# Patient Record
Sex: MALE | Race: BLACK OR AFRICAN AMERICAN | Employment: FULL TIME | URBAN - METROPOLITAN AREA
[De-identification: names, ages, dates, MRNs, and addresses within clinical notes are randomized per-mention and may not be internally consistent; named-entity substitution may affect disease eponyms.]

---

## 2017-05-25 ENCOUNTER — OFFICE VISIT (OUTPATIENT)
Dept: FAMILY MEDICINE CLINIC | Age: 27
End: 2017-05-25
Payer: COMMERCIAL

## 2017-05-25 VITALS
SYSTOLIC BLOOD PRESSURE: 120 MMHG | HEART RATE: 83 BPM | BODY MASS INDEX: 30.09 KG/M2 | DIASTOLIC BLOOD PRESSURE: 64 MMHG | WEIGHT: 227 LBS | HEIGHT: 73 IN

## 2017-05-25 DIAGNOSIS — M25.562 CHRONIC PAIN OF BOTH KNEES: Primary | ICD-10-CM

## 2017-05-25 DIAGNOSIS — Z51.81 MEDICATION MONITORING ENCOUNTER: ICD-10-CM

## 2017-05-25 DIAGNOSIS — G89.29 CHRONIC PAIN OF BOTH KNEES: Primary | ICD-10-CM

## 2017-05-25 DIAGNOSIS — M25.561 CHRONIC PAIN OF BOTH KNEES: Primary | ICD-10-CM

## 2017-05-25 DIAGNOSIS — Z13.220 SCREENING FOR LIPID DISORDERS: ICD-10-CM

## 2017-05-25 PROCEDURE — 99203 OFFICE O/P NEW LOW 30 MIN: CPT | Performed by: FAMILY MEDICINE

## 2017-05-25 RX ORDER — PREDNISONE 20 MG/1
TABLET ORAL
Qty: 34 TABLET | Refills: 0 | Status: SHIPPED | OUTPATIENT
Start: 2017-05-25 | End: 2017-06-08

## 2017-05-25 ASSESSMENT — ENCOUNTER SYMPTOMS
CHEST TIGHTNESS: 0
SORE THROAT: 0
RHINORRHEA: 0
CONSTIPATION: 0
COLOR CHANGE: 0
EYE REDNESS: 0
NAUSEA: 0
SHORTNESS OF BREATH: 0
PHOTOPHOBIA: 0
VOMITING: 0
DIARRHEA: 0
APNEA: 0
EYE PAIN: 0
EYE DISCHARGE: 0
ABDOMINAL PAIN: 0
COUGH: 0
ABDOMINAL DISTENTION: 0
WHEEZING: 0
STRIDOR: 0
EYE ITCHING: 0
BLOOD IN STOOL: 0
SINUS PRESSURE: 0
BACK PAIN: 0
CHOKING: 0

## 2017-05-25 ASSESSMENT — PATIENT HEALTH QUESTIONNAIRE - PHQ9
SUM OF ALL RESPONSES TO PHQ QUESTIONS 1-9: 0
1. LITTLE INTEREST OR PLEASURE IN DOING THINGS: 0
2. FEELING DOWN, DEPRESSED OR HOPELESS: 0
SUM OF ALL RESPONSES TO PHQ9 QUESTIONS 1 & 2: 0

## 2017-05-26 ENCOUNTER — HOSPITAL ENCOUNTER (OUTPATIENT)
Dept: GENERAL RADIOLOGY | Age: 27
Discharge: HOME | End: 2017-05-26
Payer: COMMERCIAL

## 2017-05-26 ENCOUNTER — HOSPITAL ENCOUNTER (OUTPATIENT)
Age: 27
Discharge: HOME | End: 2017-05-26
Payer: COMMERCIAL

## 2017-05-26 DIAGNOSIS — M25.562 CHRONIC PAIN OF BOTH KNEES: ICD-10-CM

## 2017-05-26 DIAGNOSIS — M25.561 CHRONIC PAIN OF BOTH KNEES: ICD-10-CM

## 2017-05-26 DIAGNOSIS — G89.29 CHRONIC PAIN OF BOTH KNEES: ICD-10-CM

## 2017-05-26 PROCEDURE — 73562 X-RAY EXAM OF KNEE 3: CPT

## 2017-05-28 DIAGNOSIS — M25.562 PAIN IN BOTH KNEES, UNSPECIFIED CHRONICITY: Primary | ICD-10-CM

## 2017-05-28 DIAGNOSIS — M25.561 PAIN IN BOTH KNEES, UNSPECIFIED CHRONICITY: Primary | ICD-10-CM

## 2017-05-30 DIAGNOSIS — M25.561 RIGHT KNEE PAIN, UNSPECIFIED CHRONICITY: Primary | ICD-10-CM

## 2017-06-08 ENCOUNTER — OFFICE VISIT (OUTPATIENT)
Dept: FAMILY MEDICINE CLINIC | Age: 27
End: 2017-06-08
Payer: COMMERCIAL

## 2017-06-08 ENCOUNTER — HOSPITAL ENCOUNTER (OUTPATIENT)
Age: 27
Setting detail: SPECIMEN
Discharge: HOME | End: 2017-06-08
Payer: COMMERCIAL

## 2017-06-08 VITALS
HEIGHT: 73 IN | DIASTOLIC BLOOD PRESSURE: 80 MMHG | SYSTOLIC BLOOD PRESSURE: 120 MMHG | BODY MASS INDEX: 29.93 KG/M2 | TEMPERATURE: 98.7 F | WEIGHT: 225.8 LBS | RESPIRATION RATE: 18 BRPM

## 2017-06-08 DIAGNOSIS — G89.29 CHRONIC PAIN OF BOTH KNEES: Primary | ICD-10-CM

## 2017-06-08 DIAGNOSIS — M25.561 CHRONIC PAIN OF BOTH KNEES: Primary | ICD-10-CM

## 2017-06-08 DIAGNOSIS — Z51.81 MEDICATION MONITORING ENCOUNTER: ICD-10-CM

## 2017-06-08 DIAGNOSIS — M25.562 CHRONIC PAIN OF BOTH KNEES: Primary | ICD-10-CM

## 2017-06-08 DIAGNOSIS — Z13.220 SCREENING FOR LIPID DISORDERS: ICD-10-CM

## 2017-06-08 LAB
ALBUMIN SERPL-MCNC: 4.2 G/DL (ref 3.5–5.2)
ALBUMIN/GLOBULIN RATIO: 1.4 (ref 1–2.5)
ALP BLD-CCNC: 59 U/L (ref 40–129)
ALT SERPL-CCNC: 16 U/L (ref 5–41)
ANION GAP SERPL CALCULATED.3IONS-SCNC: 13 MMOL/L (ref 9–17)
AST SERPL-CCNC: 14 U/L
BILIRUB SERPL-MCNC: 0.23 MG/DL (ref 0.3–1.2)
BUN BLDV-MCNC: 17 MG/DL (ref 6–20)
BUN/CREAT BLD: ABNORMAL (ref 9–20)
CALCIUM SERPL-MCNC: 9.3 MG/DL (ref 8.6–10.4)
CHLORIDE BLD-SCNC: 102 MMOL/L (ref 98–107)
CHOLESTEROL/HDL RATIO: 2.8
CHOLESTEROL: 209 MG/DL
CO2: 28 MMOL/L (ref 20–31)
CREAT SERPL-MCNC: 0.74 MG/DL (ref 0.7–1.2)
GFR AFRICAN AMERICAN: >60 ML/MIN
GFR NON-AFRICAN AMERICAN: >60 ML/MIN
GFR SERPL CREATININE-BSD FRML MDRD: ABNORMAL ML/MIN/{1.73_M2}
GFR SERPL CREATININE-BSD FRML MDRD: ABNORMAL ML/MIN/{1.73_M2}
GLUCOSE BLD-MCNC: 90 MG/DL (ref 70–99)
HDLC SERPL-MCNC: 76 MG/DL
LDL CHOLESTEROL: 120 MG/DL (ref 0–130)
POTASSIUM SERPL-SCNC: 4 MMOL/L (ref 3.7–5.3)
SODIUM BLD-SCNC: 143 MMOL/L (ref 135–144)
TOTAL PROTEIN: 7.1 G/DL (ref 6.4–8.3)
TRIGL SERPL-MCNC: 66 MG/DL
VLDLC SERPL CALC-MCNC: ABNORMAL MG/DL (ref 1–30)

## 2017-06-08 PROCEDURE — 99213 OFFICE O/P EST LOW 20 MIN: CPT | Performed by: FAMILY MEDICINE

## 2017-06-08 RX ORDER — TADALAFIL 20 MG/1
20 TABLET ORAL PRN
Qty: 3 TABLET | Refills: 0 | COMMUNITY
Start: 2017-06-08 | End: 2017-07-20

## 2017-06-08 RX ORDER — HYDROCORTISONE ACETATE 25 MG/1
25 SUPPOSITORY RECTAL EVERY 12 HOURS
Qty: 25 SUPPOSITORY | Refills: 0 | Status: SHIPPED | OUTPATIENT
Start: 2017-06-08 | End: 2017-07-20

## 2017-06-08 RX ORDER — DICLOFENAC SODIUM 75 MG/1
75 TABLET, DELAYED RELEASE ORAL 2 TIMES DAILY WITH MEALS
Qty: 60 TABLET | Refills: 5 | Status: SHIPPED | OUTPATIENT
Start: 2017-06-08 | End: 2017-07-20

## 2017-06-08 ASSESSMENT — ENCOUNTER SYMPTOMS: BACK PAIN: 0

## 2017-07-20 ENCOUNTER — OFFICE VISIT (OUTPATIENT)
Dept: FAMILY MEDICINE CLINIC | Age: 27
End: 2017-07-20
Payer: COMMERCIAL

## 2017-07-20 VITALS
OXYGEN SATURATION: 98 % | HEART RATE: 72 BPM | BODY MASS INDEX: 30.4 KG/M2 | WEIGHT: 229.4 LBS | SYSTOLIC BLOOD PRESSURE: 118 MMHG | DIASTOLIC BLOOD PRESSURE: 76 MMHG | HEIGHT: 73 IN

## 2017-07-20 DIAGNOSIS — G89.29 CHRONIC PAIN OF BOTH KNEES: Primary | ICD-10-CM

## 2017-07-20 DIAGNOSIS — M25.561 CHRONIC PAIN OF BOTH KNEES: Primary | ICD-10-CM

## 2017-07-20 DIAGNOSIS — M25.562 CHRONIC PAIN OF BOTH KNEES: Primary | ICD-10-CM

## 2017-07-20 PROCEDURE — 99213 OFFICE O/P EST LOW 20 MIN: CPT | Performed by: FAMILY MEDICINE

## 2017-07-20 RX ORDER — TRIAMCINOLONE ACETONIDE 40 MG/ML
120 INJECTION, SUSPENSION INTRA-ARTICULAR; INTRAMUSCULAR ONCE
Status: COMPLETED | OUTPATIENT
Start: 2017-07-20 | End: 2017-07-20

## 2017-07-20 RX ORDER — SULINDAC 200 MG/1
200 TABLET ORAL 2 TIMES DAILY
Qty: 60 TABLET | Refills: 3 | Status: SHIPPED | OUTPATIENT
Start: 2017-07-20 | End: 2017-08-28

## 2017-07-20 RX ORDER — HYDROCODONE BITARTRATE AND ACETAMINOPHEN 5; 325 MG/1; MG/1
1 TABLET ORAL EVERY 8 HOURS PRN
Qty: 30 TABLET | Refills: 0 | Status: SHIPPED | OUTPATIENT
Start: 2017-07-20 | End: 2017-07-27

## 2017-07-20 RX ADMIN — TRIAMCINOLONE ACETONIDE 120 MG: 40 INJECTION, SUSPENSION INTRA-ARTICULAR; INTRAMUSCULAR at 10:50

## 2017-07-20 ASSESSMENT — ENCOUNTER SYMPTOMS
BACK PAIN: 0
COUGH: 0
CHOKING: 0
EYE ITCHING: 0
SORE THROAT: 0
CHEST TIGHTNESS: 0
CONSTIPATION: 0
EYE PAIN: 0
WHEEZING: 0
STRIDOR: 0
RHINORRHEA: 0
SINUS PRESSURE: 0
VOMITING: 0
ABDOMINAL DISTENTION: 0
NAUSEA: 0
PHOTOPHOBIA: 0
APNEA: 0
COLOR CHANGE: 0
SHORTNESS OF BREATH: 0
DIARRHEA: 0
EYE REDNESS: 0
EYE DISCHARGE: 0
BLOOD IN STOOL: 0
ABDOMINAL PAIN: 0

## 2017-08-15 ENCOUNTER — TELEPHONE (OUTPATIENT)
Dept: FAMILY MEDICINE CLINIC | Age: 27
End: 2017-08-15

## 2017-08-28 ENCOUNTER — OFFICE VISIT (OUTPATIENT)
Dept: FAMILY MEDICINE CLINIC | Age: 27
End: 2017-08-28
Payer: COMMERCIAL

## 2017-08-28 VITALS
DIASTOLIC BLOOD PRESSURE: 76 MMHG | HEART RATE: 68 BPM | BODY MASS INDEX: 29.03 KG/M2 | WEIGHT: 220 LBS | SYSTOLIC BLOOD PRESSURE: 132 MMHG | OXYGEN SATURATION: 97 %

## 2017-08-28 DIAGNOSIS — M79.672 PAIN IN BOTH FEET: ICD-10-CM

## 2017-08-28 DIAGNOSIS — M79.671 PAIN IN BOTH FEET: ICD-10-CM

## 2017-08-28 DIAGNOSIS — M25.562 CHRONIC PAIN OF BOTH KNEES: Primary | ICD-10-CM

## 2017-08-28 DIAGNOSIS — G89.29 CHRONIC PAIN OF BOTH KNEES: Primary | ICD-10-CM

## 2017-08-28 DIAGNOSIS — M25.561 CHRONIC PAIN OF BOTH KNEES: Primary | ICD-10-CM

## 2017-08-28 PROCEDURE — 99213 OFFICE O/P EST LOW 20 MIN: CPT | Performed by: FAMILY MEDICINE

## 2017-08-28 ASSESSMENT — ENCOUNTER SYMPTOMS
NAUSEA: 0
PHOTOPHOBIA: 0
EYE ITCHING: 0
BLOOD IN STOOL: 0
STRIDOR: 0
COLOR CHANGE: 0
SINUS PRESSURE: 0
DIARRHEA: 0
EYE DISCHARGE: 0
EYE PAIN: 0
WHEEZING: 0
EYE REDNESS: 0
CHOKING: 0
BACK PAIN: 0
RHINORRHEA: 0
VOMITING: 0
ABDOMINAL PAIN: 0
APNEA: 0
SORE THROAT: 0
COUGH: 0
CONSTIPATION: 0
ABDOMINAL DISTENTION: 0
SHORTNESS OF BREATH: 0
CHEST TIGHTNESS: 0

## 2017-08-31 ENCOUNTER — TELEPHONE (OUTPATIENT)
Dept: FAMILY MEDICINE CLINIC | Age: 27
End: 2017-08-31

## 2017-09-09 ENCOUNTER — HOSPITAL ENCOUNTER (OUTPATIENT)
Dept: MRI IMAGING | Age: 27
Discharge: HOME | End: 2017-09-09
Payer: COMMERCIAL

## 2017-09-09 DIAGNOSIS — M25.562 CHRONIC PAIN OF BOTH KNEES: ICD-10-CM

## 2017-09-09 DIAGNOSIS — G89.29 CHRONIC PAIN OF BOTH KNEES: ICD-10-CM

## 2017-09-09 DIAGNOSIS — M25.561 CHRONIC PAIN OF BOTH KNEES: ICD-10-CM

## 2017-09-09 PROCEDURE — 73721 MRI JNT OF LWR EXTRE W/O DYE: CPT

## 2017-09-12 ENCOUNTER — TELEPHONE (OUTPATIENT)
Dept: FAMILY MEDICINE CLINIC | Age: 27
End: 2017-09-12

## 2017-09-12 ENCOUNTER — TELEPHONE (OUTPATIENT)
Dept: ORTHOPEDIC SURGERY | Age: 27
End: 2017-09-12

## 2017-09-12 ENCOUNTER — OFFICE VISIT (OUTPATIENT)
Dept: ORTHOPEDIC SURGERY | Age: 27
End: 2017-09-12
Payer: COMMERCIAL

## 2017-09-12 VITALS
DIASTOLIC BLOOD PRESSURE: 79 MMHG | OXYGEN SATURATION: 99 % | SYSTOLIC BLOOD PRESSURE: 128 MMHG | HEART RATE: 72 BPM | WEIGHT: 222 LBS | HEIGHT: 73 IN | BODY MASS INDEX: 29.42 KG/M2

## 2017-09-12 DIAGNOSIS — M22.2X2 PATELLOFEMORAL SYNDROME, BILATERAL: Primary | ICD-10-CM

## 2017-09-12 DIAGNOSIS — M22.2X1 PATELLOFEMORAL SYNDROME, BILATERAL: Primary | ICD-10-CM

## 2017-09-12 PROCEDURE — 99203 OFFICE O/P NEW LOW 30 MIN: CPT | Performed by: FAMILY MEDICINE

## 2017-09-12 ASSESSMENT — PATIENT HEALTH QUESTIONNAIRE - PHQ9
SUM OF ALL RESPONSES TO PHQ9 QUESTIONS 1 & 2: 0
SUM OF ALL RESPONSES TO PHQ QUESTIONS 1-9: 0
2. FEELING DOWN, DEPRESSED OR HOPELESS: 0
1. LITTLE INTEREST OR PLEASURE IN DOING THINGS: 0

## 2017-09-13 ENCOUNTER — OFFICE VISIT (OUTPATIENT)
Dept: FAMILY MEDICINE CLINIC | Age: 27
End: 2017-09-13
Payer: COMMERCIAL

## 2017-09-13 VITALS
HEIGHT: 73 IN | DIASTOLIC BLOOD PRESSURE: 76 MMHG | SYSTOLIC BLOOD PRESSURE: 120 MMHG | BODY MASS INDEX: 30.22 KG/M2 | WEIGHT: 228 LBS | HEART RATE: 101 BPM | OXYGEN SATURATION: 98 %

## 2017-09-13 DIAGNOSIS — M79.671 PAIN IN BOTH FEET: ICD-10-CM

## 2017-09-13 DIAGNOSIS — M79.604 PAIN IN BOTH LOWER EXTREMITIES: ICD-10-CM

## 2017-09-13 DIAGNOSIS — M22.2X1 PATELLOFEMORAL PAIN SYNDROME OF BOTH KNEES: Primary | ICD-10-CM

## 2017-09-13 DIAGNOSIS — M22.2X2 PATELLOFEMORAL PAIN SYNDROME OF BOTH KNEES: Primary | ICD-10-CM

## 2017-09-13 DIAGNOSIS — M79.672 PAIN IN BOTH FEET: ICD-10-CM

## 2017-09-13 DIAGNOSIS — M79.605 PAIN IN BOTH LOWER EXTREMITIES: ICD-10-CM

## 2017-09-13 PROCEDURE — 99213 OFFICE O/P EST LOW 20 MIN: CPT | Performed by: FAMILY MEDICINE

## 2017-09-13 ASSESSMENT — ENCOUNTER SYMPTOMS
RHINORRHEA: 0
CONSTIPATION: 0
NAUSEA: 0
DIARRHEA: 0
BLOOD IN STOOL: 0
WHEEZING: 0
BACK PAIN: 0
EYE DISCHARGE: 0
APNEA: 0
ABDOMINAL PAIN: 0
COUGH: 0
STRIDOR: 0
SHORTNESS OF BREATH: 0
CHEST TIGHTNESS: 0
EYE REDNESS: 0
SORE THROAT: 0
CHOKING: 0
COLOR CHANGE: 0
EYE ITCHING: 0
ABDOMINAL DISTENTION: 0
EYE PAIN: 0
SINUS PRESSURE: 0
PHOTOPHOBIA: 0
VOMITING: 0

## 2017-09-14 ENCOUNTER — TELEPHONE (OUTPATIENT)
Dept: FAMILY MEDICINE CLINIC | Age: 27
End: 2017-09-14

## 2017-11-29 ENCOUNTER — OFFICE VISIT (OUTPATIENT)
Dept: FAMILY MEDICINE CLINIC | Age: 27
End: 2017-11-29
Payer: COMMERCIAL

## 2017-11-29 VITALS — WEIGHT: 231 LBS | BODY MASS INDEX: 30.48 KG/M2 | SYSTOLIC BLOOD PRESSURE: 116 MMHG | DIASTOLIC BLOOD PRESSURE: 76 MMHG

## 2017-11-29 DIAGNOSIS — M79.671 PAIN IN BOTH FEET: ICD-10-CM

## 2017-11-29 DIAGNOSIS — Z23 NEED FOR IMMUNIZATION AGAINST INFLUENZA: ICD-10-CM

## 2017-11-29 DIAGNOSIS — M79.672 PAIN IN BOTH FEET: ICD-10-CM

## 2017-11-29 DIAGNOSIS — J01.80 ACUTE NON-RECURRENT SINUSITIS OF OTHER SINUS: Primary | ICD-10-CM

## 2017-11-29 DIAGNOSIS — M25.561 CHRONIC PAIN OF BOTH KNEES: ICD-10-CM

## 2017-11-29 DIAGNOSIS — M25.562 CHRONIC PAIN OF BOTH KNEES: ICD-10-CM

## 2017-11-29 DIAGNOSIS — G89.29 CHRONIC PAIN OF BOTH KNEES: ICD-10-CM

## 2017-11-29 PROCEDURE — 99214 OFFICE O/P EST MOD 30 MIN: CPT | Performed by: FAMILY MEDICINE

## 2017-11-29 PROCEDURE — 90686 IIV4 VACC NO PRSV 0.5 ML IM: CPT | Performed by: FAMILY MEDICINE

## 2017-11-29 PROCEDURE — 90471 IMMUNIZATION ADMIN: CPT | Performed by: FAMILY MEDICINE

## 2017-11-29 RX ORDER — DOXYCYCLINE HYCLATE 100 MG
100 TABLET ORAL 2 TIMES DAILY
Qty: 20 TABLET | Refills: 0 | Status: SHIPPED | OUTPATIENT
Start: 2017-11-29 | End: 2017-12-09

## 2017-11-29 RX ORDER — PREDNISONE 50 MG/1
50 TABLET ORAL
Qty: 10 TABLET | Refills: 0 | Status: SHIPPED | OUTPATIENT
Start: 2017-11-29 | End: 2017-12-09

## 2017-11-29 ASSESSMENT — ENCOUNTER SYMPTOMS
DIARRHEA: 0
CHEST TIGHTNESS: 0
SORE THROAT: 0
NAUSEA: 0
WHEEZING: 0
EYE DISCHARGE: 0
COLOR CHANGE: 0
PHOTOPHOBIA: 0
ABDOMINAL PAIN: 0
STRIDOR: 0
COUGH: 0
EYE ITCHING: 0
BACK PAIN: 0
SHORTNESS OF BREATH: 0
CONSTIPATION: 0
CHOKING: 0
VOMITING: 0
EYE PAIN: 0
BLOOD IN STOOL: 0
APNEA: 0
RHINORRHEA: 0
ABDOMINAL DISTENTION: 0
EYE REDNESS: 0
SINUS PRESSURE: 0

## 2017-11-29 NOTE — PROGRESS NOTES
Subjective:      Patient ID: Aisha Hugo is a 28 y.o. male. HPI  Here for follow up of knee pain and foot pain and has been having some trouble with sinus and ear pressure recently for the past few weeks and seems to be getting worse and has been causing headaches and low grade fever. His knees have been continuing to be painful but has been progressively better over the past few weeks. He has been in a new position at work which has seemed to help with this as well. The foot pain has been better as well but he has not gotten the custom orthotics yet but they are in the process of being made. Review of Systems   Constitutional: Negative for activity change, appetite change, chills, diaphoresis, fatigue, fever and unexpected weight change. HENT: Negative for congestion, dental problem, ear pain, hearing loss, mouth sores, nosebleeds, postnasal drip, rhinorrhea, sinus pressure and sore throat. Eyes: Negative for photophobia, pain, discharge, redness, itching and visual disturbance. Respiratory: Negative for apnea, cough, choking, chest tightness, shortness of breath, wheezing and stridor. Cardiovascular: Negative for chest pain, palpitations and leg swelling. Gastrointestinal: Negative for abdominal distention, abdominal pain, blood in stool, constipation, diarrhea, nausea and vomiting. Genitourinary: Negative for decreased urine volume, difficulty urinating, dysuria, flank pain, frequency, scrotal swelling, testicular pain and urgency. Musculoskeletal: Negative for back pain, gait problem, joint swelling, myalgias, neck pain and neck stiffness. Skin: Negative for color change, pallor and rash. Neurological: Negative for dizziness, tremors, seizures, syncope, facial asymmetry, speech difficulty, weakness, light-headedness, numbness and headaches. Psychiatric/Behavioral: Negative for agitation, behavioral problems, decreased concentration, sleep disturbance and suicidal ideas. The patient is not nervous/anxious. Objective:   Physical Exam   Constitutional: He appears well-developed and well-nourished. HENT:   Head: Normocephalic. Right Ear: Tympanic membrane is not erythematous and not bulging. Left Ear: Tympanic membrane is not erythematous and not bulging. Nose: No mucosal edema or rhinorrhea. Mouth/Throat: Uvula is midline, oropharynx is clear and moist and mucous membranes are normal.   Eyes: Conjunctivae and EOM are normal. Pupils are equal, round, and reactive to light. Neck: Trachea normal, normal range of motion and full passive range of motion without pain. Neck supple. No JVD present. Carotid bruit is not present. Cardiovascular: Normal rate, regular rhythm, S1 normal, S2 normal, normal heart sounds and normal pulses. Exam reveals no gallop, no S3, no S4, no distant heart sounds and no friction rub. No murmur heard. No systolic murmur is present   Pulmonary/Chest: Effort normal and breath sounds normal.   Abdominal: Normal appearance and bowel sounds are normal. There is no tenderness. Lymphadenopathy:     He has no cervical adenopathy. Right cervical: No superficial cervical and no deep cervical adenopathy present. Left cervical: No superficial cervical and no deep cervical adenopathy present. Neurological: He is alert. He has normal strength. No cranial nerve deficit or sensory deficit. He displays a negative Romberg sign. Reflex Scores:       Brachioradialis reflexes are 2+ on the right side and 2+ on the left side. Patellar reflexes are 2+ on the right side and 2+ on the left side. Achilles reflexes are 2+ on the right side and 2+ on the left side. Skin: Skin is warm, dry and intact. He is not diaphoretic. No pallor. Psychiatric: He has a normal mood and affect.  His speech is normal and behavior is normal. Judgment and thought content normal. Cognition and memory are normal.       Assessment:            Plan:

## 2018-03-16 ENCOUNTER — HOSPITAL ENCOUNTER (OUTPATIENT)
Age: 28
Setting detail: SPECIMEN
Discharge: HOME | End: 2018-03-16
Payer: COMMERCIAL

## 2018-03-16 ENCOUNTER — OFFICE VISIT (OUTPATIENT)
Dept: FAMILY MEDICINE CLINIC | Age: 28
End: 2018-03-16
Payer: COMMERCIAL

## 2018-03-16 VITALS
WEIGHT: 246.4 LBS | HEIGHT: 73 IN | BODY MASS INDEX: 32.66 KG/M2 | OXYGEN SATURATION: 98 % | DIASTOLIC BLOOD PRESSURE: 84 MMHG | HEART RATE: 77 BPM | SYSTOLIC BLOOD PRESSURE: 120 MMHG

## 2018-03-16 DIAGNOSIS — G89.29 CHRONIC PAIN OF BOTH KNEES: ICD-10-CM

## 2018-03-16 DIAGNOSIS — M79.671 PAIN IN BOTH FEET: Primary | ICD-10-CM

## 2018-03-16 DIAGNOSIS — Z11.3 SCREEN FOR STD (SEXUALLY TRANSMITTED DISEASE): ICD-10-CM

## 2018-03-16 DIAGNOSIS — M79.672 PAIN IN BOTH FEET: Primary | ICD-10-CM

## 2018-03-16 DIAGNOSIS — N50.89 TESTICULAR NODULE: ICD-10-CM

## 2018-03-16 DIAGNOSIS — M25.562 CHRONIC PAIN OF BOTH KNEES: ICD-10-CM

## 2018-03-16 DIAGNOSIS — N50.819 TESTICULAR PAIN: ICD-10-CM

## 2018-03-16 DIAGNOSIS — M25.561 CHRONIC PAIN OF BOTH KNEES: ICD-10-CM

## 2018-03-16 LAB
HIV AG/AB: NONREACTIVE
T. PALLIDUM, IGG: NONREACTIVE

## 2018-03-16 PROCEDURE — 99214 OFFICE O/P EST MOD 30 MIN: CPT | Performed by: FAMILY MEDICINE

## 2018-03-16 ASSESSMENT — ENCOUNTER SYMPTOMS
PHOTOPHOBIA: 0
ABDOMINAL DISTENTION: 0
CONSTIPATION: 0
SHORTNESS OF BREATH: 0
COUGH: 0
BACK PAIN: 0
ABDOMINAL PAIN: 0
CHOKING: 0
EYE PAIN: 0
APNEA: 0
WHEEZING: 0
DIARRHEA: 0
COLOR CHANGE: 0
VOMITING: 0
EYE DISCHARGE: 0
SORE THROAT: 0
EYE ITCHING: 0
CHEST TIGHTNESS: 0
BLOOD IN STOOL: 0
EYE REDNESS: 0
NAUSEA: 0
SINUS PRESSURE: 0
STRIDOR: 0
RHINORRHEA: 0

## 2018-03-16 NOTE — PROGRESS NOTES
appears well-developed and well-nourished. HENT:   Head: Normocephalic. Right Ear: Tympanic membrane is not erythematous and not bulging. Left Ear: Tympanic membrane is not erythematous and not bulging. Nose: No mucosal edema or rhinorrhea. Mouth/Throat: Uvula is midline, oropharynx is clear and moist and mucous membranes are normal.   Eyes: Conjunctivae and EOM are normal. Pupils are equal, round, and reactive to light. Neck: Trachea normal, normal range of motion and full passive range of motion without pain. Neck supple. No JVD present. Carotid bruit is not present. Cardiovascular: Normal rate, regular rhythm, S1 normal, S2 normal, normal heart sounds and normal pulses. Exam reveals no gallop, no S3, no S4, no distant heart sounds and no friction rub. No murmur heard. No systolic murmur is present   Pulmonary/Chest: Effort normal and breath sounds normal.   Abdominal: Normal appearance and bowel sounds are normal. There is no tenderness. Genitourinary:         Musculoskeletal:        Right ankle: He exhibits decreased range of motion and swelling. He exhibits no ecchymosis, no deformity and no laceration. Tenderness. Head of 5th metatarsal tenderness found. Left ankle: He exhibits decreased range of motion and swelling. He exhibits no ecchymosis, no deformity, no laceration and normal pulse. Tenderness. Head of 5th metatarsal tenderness found. Lymphadenopathy:     He has no cervical adenopathy. Right cervical: No superficial cervical and no deep cervical adenopathy present. Left cervical: No superficial cervical and no deep cervical adenopathy present. Neurological: He is alert. He has normal strength. No cranial nerve deficit or sensory deficit. He displays a negative Romberg sign. Reflex Scores:       Brachioradialis reflexes are 2+ on the right side and 2+ on the left side. Patellar reflexes are 2+ on the right side and 2+ on the left side.        Achilles

## 2018-03-19 LAB
C. TRACHOMATIS DNA ,URINE: NEGATIVE
N. GONORRHOEAE DNA, URINE: NEGATIVE

## 2018-03-21 ENCOUNTER — HOSPITAL ENCOUNTER (OUTPATIENT)
Dept: ULTRASOUND IMAGING | Age: 28
Discharge: HOME | End: 2018-03-23
Payer: COMMERCIAL

## 2018-03-21 DIAGNOSIS — N50.89 TESTICULAR NODULE: ICD-10-CM

## 2018-03-21 DIAGNOSIS — N50.819 TESTICULAR PAIN: ICD-10-CM

## 2018-03-21 PROCEDURE — 76870 US EXAM SCROTUM: CPT

## 2018-03-23 DIAGNOSIS — I83.819 VARICOSE VEINS WITH PAIN: Primary | ICD-10-CM

## 2018-03-29 ENCOUNTER — TELEPHONE (OUTPATIENT)
Dept: FAMILY MEDICINE CLINIC | Age: 28
End: 2018-03-29

## 2018-03-29 NOTE — TELEPHONE ENCOUNTER
I have gave the paper work to Dr. Grace Mata and I am waiting for him to resign and date so I can fax over

## 2018-05-30 ENCOUNTER — OFFICE VISIT (OUTPATIENT)
Dept: FAMILY MEDICINE CLINIC | Age: 28
End: 2018-05-30
Payer: COMMERCIAL

## 2018-05-30 VITALS
BODY MASS INDEX: 32.76 KG/M2 | HEART RATE: 90 BPM | HEIGHT: 73 IN | SYSTOLIC BLOOD PRESSURE: 120 MMHG | WEIGHT: 247.2 LBS | DIASTOLIC BLOOD PRESSURE: 80 MMHG | OXYGEN SATURATION: 98 %

## 2018-05-30 DIAGNOSIS — H10.13 ALLERGIC CONJUNCTIVITIS OF BOTH EYES: Primary | ICD-10-CM

## 2018-05-30 DIAGNOSIS — G89.29 CHRONIC LEFT SI JOINT PAIN: ICD-10-CM

## 2018-05-30 DIAGNOSIS — M25.561 CHRONIC PAIN OF BOTH KNEES: ICD-10-CM

## 2018-05-30 DIAGNOSIS — M53.3 CHRONIC LEFT SI JOINT PAIN: ICD-10-CM

## 2018-05-30 DIAGNOSIS — M25.562 CHRONIC PAIN OF BOTH KNEES: ICD-10-CM

## 2018-05-30 DIAGNOSIS — G89.29 CHRONIC PAIN OF BOTH KNEES: ICD-10-CM

## 2018-05-30 PROCEDURE — 99213 OFFICE O/P EST LOW 20 MIN: CPT | Performed by: FAMILY MEDICINE

## 2018-05-30 ASSESSMENT — ENCOUNTER SYMPTOMS
COLOR CHANGE: 0
SINUS PRESSURE: 0
RHINORRHEA: 0
DIARRHEA: 0
CHOKING: 0
EYE DISCHARGE: 1
VOMITING: 0
NAUSEA: 0
COUGH: 0
CONSTIPATION: 0
SORE THROAT: 0
WHEEZING: 0
EYE REDNESS: 1
EYE PAIN: 0
EYE ITCHING: 1
BACK PAIN: 0
BLOOD IN STOOL: 0
PHOTOPHOBIA: 0
STRIDOR: 0
APNEA: 0
ABDOMINAL DISTENTION: 0
ABDOMINAL PAIN: 0
SHORTNESS OF BREATH: 0
CHEST TIGHTNESS: 0

## 2018-08-08 ENCOUNTER — OFFICE VISIT (OUTPATIENT)
Dept: FAMILY MEDICINE CLINIC | Age: 28
End: 2018-08-08
Payer: COMMERCIAL

## 2018-08-08 VITALS
SYSTOLIC BLOOD PRESSURE: 112 MMHG | TEMPERATURE: 98.4 F | BODY MASS INDEX: 32.85 KG/M2 | DIASTOLIC BLOOD PRESSURE: 78 MMHG | WEIGHT: 249 LBS

## 2018-08-08 DIAGNOSIS — J01.41 ACUTE RECURRENT PANSINUSITIS: Primary | ICD-10-CM

## 2018-08-08 DIAGNOSIS — J30.2 SEASONAL ALLERGIC RHINITIS, UNSPECIFIED TRIGGER: ICD-10-CM

## 2018-08-08 PROCEDURE — 99213 OFFICE O/P EST LOW 20 MIN: CPT | Performed by: FAMILY MEDICINE

## 2018-08-08 RX ORDER — DOXYCYCLINE HYCLATE 100 MG
100 TABLET ORAL 2 TIMES DAILY
Qty: 20 TABLET | Refills: 0 | Status: SHIPPED | OUTPATIENT
Start: 2018-08-08 | End: 2018-08-18

## 2018-08-08 RX ORDER — PREDNISONE 50 MG/1
50 TABLET ORAL
Qty: 10 TABLET | Refills: 0 | Status: SHIPPED | OUTPATIENT
Start: 2018-08-08 | End: 2018-08-18

## 2018-08-08 RX ORDER — LEVOCETIRIZINE DIHYDROCHLORIDE 5 MG/1
5 TABLET, FILM COATED ORAL NIGHTLY
Qty: 90 TABLET | Refills: 1 | Status: SHIPPED | OUTPATIENT
Start: 2018-08-08 | End: 2020-01-03

## 2018-08-08 RX ORDER — MOMETASONE FUROATE 50 UG/1
2 SPRAY, METERED NASAL DAILY
Qty: 3 INHALER | Refills: 3 | Status: SHIPPED | OUTPATIENT
Start: 2018-08-08 | End: 2020-01-03

## 2018-08-08 RX ORDER — MONTELUKAST SODIUM 10 MG/1
10 TABLET ORAL DAILY
Qty: 90 TABLET | Refills: 3 | Status: SHIPPED | OUTPATIENT
Start: 2018-08-08 | End: 2020-01-03 | Stop reason: CLARIF

## 2018-08-08 ASSESSMENT — ENCOUNTER SYMPTOMS
SORE THROAT: 1
SINUS PRESSURE: 1
VOMITING: 0
WHEEZING: 0
CHEST TIGHTNESS: 0
EYE PAIN: 0
ABDOMINAL DISTENTION: 0
APNEA: 0
ABDOMINAL PAIN: 0
EYE DISCHARGE: 0
COLOR CHANGE: 0
EYE REDNESS: 0
NAUSEA: 0
CHOKING: 0
SHORTNESS OF BREATH: 1
BLOOD IN STOOL: 0
CONSTIPATION: 0
RHINORRHEA: 0
PHOTOPHOBIA: 0
BACK PAIN: 0
STRIDOR: 0
COUGH: 1
DIARRHEA: 0
EYE ITCHING: 0

## 2020-01-03 ENCOUNTER — OFFICE VISIT (OUTPATIENT)
Dept: INTERNAL MEDICINE CLINIC | Age: 30
End: 2020-01-03
Payer: COMMERCIAL

## 2020-01-03 VITALS
HEART RATE: 75 BPM | HEIGHT: 73 IN | WEIGHT: 191.4 LBS | DIASTOLIC BLOOD PRESSURE: 79 MMHG | BODY MASS INDEX: 25.37 KG/M2 | OXYGEN SATURATION: 98 % | SYSTOLIC BLOOD PRESSURE: 147 MMHG

## 2020-01-03 PROCEDURE — 99204 OFFICE O/P NEW MOD 45 MIN: CPT | Performed by: PHYSICIAN ASSISTANT

## 2020-01-03 ASSESSMENT — ENCOUNTER SYMPTOMS
RHINORRHEA: 0
PHOTOPHOBIA: 0
COUGH: 0
DIARRHEA: 0
EYE PAIN: 0
TROUBLE SWALLOWING: 0
NAUSEA: 0
EYE ITCHING: 0
BLOOD IN STOOL: 0
SHORTNESS OF BREATH: 0
SINUS PAIN: 0
CHOKING: 0
EYE REDNESS: 0
WHEEZING: 0
COLOR CHANGE: 0
SORE THROAT: 0
CONSTIPATION: 0
EYE DISCHARGE: 0
VOICE CHANGE: 0
ABDOMINAL PAIN: 0
BACK PAIN: 1
CHEST TIGHTNESS: 0
VOMITING: 0

## 2020-01-03 ASSESSMENT — PATIENT HEALTH QUESTIONNAIRE - PHQ9
1. LITTLE INTEREST OR PLEASURE IN DOING THINGS: 1
SUM OF ALL RESPONSES TO PHQ9 QUESTIONS 1 & 2: 2
2. FEELING DOWN, DEPRESSED OR HOPELESS: 1
SUM OF ALL RESPONSES TO PHQ QUESTIONS 1-9: 2
SUM OF ALL RESPONSES TO PHQ QUESTIONS 1-9: 2

## 2020-01-03 NOTE — PATIENT INSTRUCTIONS
Patient Education        DASH Diet: Care Instructions  Your Care Instructions    The DASH diet is an eating plan that can help lower your blood pressure. DASH stands for Dietary Approaches to Stop Hypertension. Hypertension is high blood pressure. The DASH diet focuses on eating foods that are high in calcium, potassium, and magnesium. These nutrients can lower blood pressure. The foods that are highest in these nutrients are fruits, vegetables, low-fat dairy products, nuts, seeds, and legumes. But taking calcium, potassium, and magnesium supplements instead of eating foods that are high in those nutrients does not have the same effect. The DASH diet also includes whole grains, fish, and poultry. The DASH diet is one of several lifestyle changes your doctor may recommend to lower your high blood pressure. Your doctor may also want you to decrease the amount of sodium in your diet. Lowering sodium while following the DASH diet can lower blood pressure even further than just the DASH diet alone. Follow-up care is a key part of your treatment and safety. Be sure to make and go to all appointments, and call your doctor if you are having problems. It's also a good idea to know your test results and keep a list of the medicines you take. How can you care for yourself at home? Following the DASH diet  · Eat 4 to 5 servings of fruit each day. A serving is 1 medium-sized piece of fruit, ½ cup chopped or canned fruit, 1/4 cup dried fruit, or 4 ounces (½ cup) of fruit juice. Choose fruit more often than fruit juice. · Eat 4 to 5 servings of vegetables each day. A serving is 1 cup of lettuce or raw leafy vegetables, ½ cup of chopped or cooked vegetables, or 4 ounces (½ cup) of vegetable juice. Choose vegetables more often than vegetable juice. · Get 2 to 3 servings of low-fat and fat-free dairy each day. A serving is 8 ounces of milk, 1 cup of yogurt, or 1 ½ ounces of cheese. · Eat 6 to 8 servings of grains each day. A serving is 1 slice of bread, 1 ounce of dry cereal, or ½ cup of cooked rice, pasta, or cooked cereal. Try to choose whole-grain products as much as possible. · Limit lean meat, poultry, and fish to 2 servings each day. A serving is 3 ounces, about the size of a deck of cards. · Eat 4 to 5 servings of nuts, seeds, and legumes (cooked dried beans, lentils, and split peas) each week. A serving is 1/3 cup of nuts, 2 tablespoons of seeds, or ½ cup of cooked beans or peas. · Limit fats and oils to 2 to 3 servings each day. A serving is 1 teaspoon of vegetable oil or 2 tablespoons of salad dressing. · Limit sweets and added sugars to 5 servings or less a week. A serving is 1 tablespoon jelly or jam, ½ cup sorbet, or 1 cup of lemonade. · Eat less than 2,300 milligrams (mg) of sodium a day. If you limit your sodium to 1,500 mg a day, you can lower your blood pressure even more. Tips for success  · Start small. Do not try to make dramatic changes to your diet all at once. You might feel that you are missing out on your favorite foods and then be more likely to not follow the plan. Make small changes, and stick with them. Once those changes become habit, add a few more changes. · Try some of the following:  ? Make it a goal to eat a fruit or vegetable at every meal and at snacks. This will make it easy to get the recommended amount of fruits and vegetables each day. ? Try yogurt topped with fruit and nuts for a snack or healthy dessert. ? Add lettuce, tomato, cucumber, and onion to sandwiches. ? Combine a ready-made pizza crust with low-fat mozzarella cheese and lots of vegetable toppings. Try using tomatoes, squash, spinach, broccoli, carrots, cauliflower, and onions. ? Have a variety of cut-up vegetables with a low-fat dip as an appetizer instead of chips and dip. ? Sprinkle sunflower seeds or chopped almonds over salads. Or try adding chopped walnuts or almonds to cooked vegetables.   ? Try some vegetarian meals using beans and peas. Add garbanzo or kidney beans to salads. Make burritos and tacos with mashed moran beans or black beans. Where can you learn more? Go to https://aniket.Pickie. org and sign in to your [employer] account. Enter Q117 in the [facility] box to learn more about \"DASH Diet: Care Instructions. \"     If you do not have an account, please click on the \"Sign Up Now\" link. Current as of: July 22, 2018  Content Version: 12.1  © 7895-7486 [employer]. Care instructions adapted under license by [facility] ([facility]). If you have questions about a medical condition or this instruction, always ask your healthcare professional. [address] any warranty or liability for your use of this information. Patient Education        DASH Diet: Care Instructions  Your Care Instructi    The DASH diet is an eating plan that can help lower your blood pressure. DASH stands for Dietary Approaches to Stop Hypertension. Hypertension is high blood pressure. The DASH diet focuses on eating foods that are high in calcium, potassium, and magnesium. These nutrients can lower blood pressure. The foods that are highest in these nutrients are fruits, vegetables, low-fat dairy products, nuts, seeds, and legumes. But taking calcium, potassium, and magnesium supplements instead of eating foods that are high in those nutrients does not have the same effect. The DASH diet also includes whole grains, fish, and poultry. The DASH diet is one of several lifestyle changes your doctor may recommend to lower your high blood pressure. Your doctor may also want you to decrease the amount of sodium in your diet. Lowering sodium while following the DASH diet can lower blood pressure even further than just the DASH diet alone. Follow-up care is a key part of your treatment and safety. Be sure to make and go to all appointments, and call your doctor if you are having problems.  It's add a few more changes. · Try some of the following:  ? Make it a goal to eat a fruit or vegetable at every meal and at snacks. This will make it easy to get the recommended amount of fruits and vegetables each day. ? Try yogurt topped with fruit and nuts for a snack or healthy dessert. ? Add lettuce, tomato, cucumber, and onion to sandwiches. ? Combine a ready-made pizza crust with low-fat mozzarella cheese and lots of vegetable toppings. Try using tomatoes, squash, spinach, broccoli, carrots, cauliflower, and onions. ? Have a variety of cut-up vegetables with a low-fat dip as an appetizer instead of chips and dip. ? Sprinkle sunflower seeds or chopped almonds over salads. Or try adding chopped walnuts or almonds to cooked vegetables. ? Try some vegetarian meals using beans and peas. Add garbanzo or kidney beans to salads. Make burritos and tacos with mashed moran beans or black beans. Where can you learn more? Go to https://SciGitpepiceweb.Quofore. org and sign in to your blogTV account. Enter L395 in the [facility] box to learn more about \"DASH Diet: Care Instructions. \"     If you do not have an account, please click on the \"Sign Up Now\" link. Current as of: July 22, 2018  Content Version: 12.1  © 8282-4963 [employer], [facility]. Care instructions adapted under license by [facility] ([facility]). If you have questions about a medical condition or this instruction, always ask your healthcare professional. [place] [address] any warranty or liability for your use of this information.

## 2020-01-03 NOTE — PROGRESS NOTES
Lifestyle    Physical activity:     Days per week: None     Minutes per session: None    Stress: None   Relationships    Social connections:     Talks on phone: None     Gets together: None     Attends L[employer] service: None     Active member of club or organization: None     Attends meetings of clubs or organizations: None     Relationship status: None    Intimate partner violence:     Fear of current or ex partner: None     Emotionally abused: None     Physically abused: None     Forced sexual activity: None   Other Topics Concern    None   Social History Narrative    None       REVIEW OF SYSTEMS:   Review of Systems   Constitutional: Negative for appetite change, chills, diaphoresis, fatigue, fever and unexpected weight change. HENT: Negative for congestion, dental problem, ear discharge, ear pain, hearing loss, postnasal drip, rhinorrhea, sinus pain, sore throat, trouble swallowing and voice change. Eyes: Negative for photophobia, pain, discharge, redness, itching and visual disturbance. Respiratory: Negative for cough, choking, chest tightness, shortness of breath and wheezing. Cardiovascular: Negative for chest pain, palpitations and leg swelling. Gastrointestinal: Negative for abdominal pain, blood in stool, constipation, diarrhea, nausea and vomiting. Endocrine: Negative for cold intolerance, heat intolerance, polydipsia, polyphagia and polyuria. Genitourinary: Negative for difficulty urinating, dysuria, flank pain, frequency, hematuria, scrotal swelling, testicular pain and urgency. Musculoskeletal: Positive for arthralgias, back pain and gait problem (instability). Negative for joint swelling, neck pain and neck stiffness. Skin: Negative for color change, pallor and rash. Allergic/Immunologic: Negative for immunocompromised state. Neurological: Positive for weakness (lower ext) and numbness.  Negative for dizziness, tremors, seizures, syncope, facial asymmetry, speech difficulty, light-headedness and headaches. Hematological: Negative for adenopathy. Does not bruise/bleed easily. Psychiatric/Behavioral: Negative for dysphoric mood and sleep disturbance. The patient is not nervous/anxious.       PHYSICAL EXAM:      Vitals:    01/03/20 1137   BP: (!) 147/79   Site: Right Upper Arm   Position: Sitting   Cuff Size: Medium Adult   Pulse: 75   SpO2: 98%   Weight: 191 lb 6.4 oz (86.8 kg)   Height: 6' 0.99\" (1.854 m)     General - alert, well appearing, and in no distress  Skin - normal coloration and turgor, no rashes, no suspicious skin lesions noted  Eyes - pupils equal and reactive, extraocular eye movements intact  Ears - bilateral TM's and external ear canals normal  Nose - normal and patent, no erythema, discharge or polyps  Mouth - mucous membranes moist, pharynx normal without lesions  Neck - supple, nosignificant adenopathy, no palpable masses, no carotid bruit bilaterally   Lymphatics - no palpable lymphadenopathy, no hepatosplenomegaly  Chest - clear to auscultation, no wheezes, rales or rhonchi, symmetric air entry  Heart - normal rate, regular rhythm, no murmurs, rubs, clicks or gallops  Abdomen - soft, nontender, nondistended, no masses or organomegaly  Back - full range of motion, no tenderness, palpable spasm or pain on motion  Neurological -alert, oriented, normal speech, no focal sensory or motor deficit, cranial nerve exam without deficit  Musculoskeletal - no joint tenderness, deformity or swelling, strength 4/5 in lower extremities, decreased DTR bilaterally, upper extremities normal    Extremities - peripheral pulses normal, no pedal edema    LABORATORY FINDINGS:    CBC: No results found for: WBC, HGB, PLT  BMP:    Lab Results   Component Value Date     06/08/2017    K 4.0 06/08/2017     06/08/2017    CO2 28 06/08/2017    BUN 17 06/08/2017    CREATININE 0.74 06/08/2017    GLUCOSE 90 06/08/2017     Hemoglobin A1C: No results found for: LABA1C  Lipid was generated using voice recognition Dragon dictation software. Although every effort was made to ensure the accuracy of this automatedtranscription, some errors in transcription may have occurred.

## 2020-01-11 ENCOUNTER — HOSPITAL ENCOUNTER (OUTPATIENT)
Dept: MRI IMAGING | Age: 30
Discharge: HOME | End: 2020-01-13
Payer: COMMERCIAL

## 2020-01-11 PROCEDURE — 6360000004 HC RX CONTRAST MEDICATION: Performed by: PHYSICIAN ASSISTANT

## 2020-01-11 PROCEDURE — 72158 MRI LUMBAR SPINE W/O & W/DYE: CPT

## 2020-01-11 PROCEDURE — A9579 GAD-BASE MR CONTRAST NOS,1ML: HCPCS | Performed by: PHYSICIAN ASSISTANT

## 2020-01-11 RX ADMIN — GADOTERIDOL 19 ML: 279.3 INJECTION, SOLUTION INTRAVENOUS at 12:58

## 2020-01-22 ENCOUNTER — OFFICE VISIT (OUTPATIENT)
Dept: NEUROLOGY | Age: 30
End: 2020-01-22
Payer: COMMERCIAL

## 2020-01-22 VITALS
BODY MASS INDEX: 26.53 KG/M2 | SYSTOLIC BLOOD PRESSURE: 126 MMHG | DIASTOLIC BLOOD PRESSURE: 73 MMHG | WEIGHT: 200.2 LBS | HEIGHT: 73 IN | HEART RATE: 77 BPM

## 2020-01-22 PROCEDURE — 99204 OFFICE O/P NEW MOD 45 MIN: CPT | Performed by: PSYCHIATRY & NEUROLOGY

## 2020-01-22 SDOH — HEALTH STABILITY: MENTAL HEALTH: HOW OFTEN DO YOU HAVE A DRINK CONTAINING ALCOHOL?: 2-3 TIMES A WEEK

## 2020-01-22 NOTE — PROGRESS NOTES
Right 2019    tendon release, tend to last toe     Social History: Russell Persaud  reports that he quit smoking about a year ago. His smoking use included cigarettes. He has a 5.00 pack-year smoking history. He has never used smokeless tobacco. He reports current alcohol use. He reports previous drug use. Family History   Problem Relation Age of Onset    Seizures Mother     Mult Sclerosis Sister     Diabetes Maternal Grandmother        On exam: Blood pressure 126/73, pulse 77, height 6' 1\" (1.854 m), weight 200 lb 3.2 oz (90.8 kg). NEUROLOGIC EXAMINATION  GENERAL  Appears comfortable and in no distress   HEENT  NC/ AT   NECK  Supple and no bruits heard   MENTAL STATUS:  Alert, oriented, intact memory, no confusion, normal speech, normal language, no hallucination or delusion   CRANIAL NERVES: II     -      PERRLA, Fundi reveal intact venous pulsations; Visual fields intact to confrontation  III,IV,VI -  EOMs full, no afferent defect, no  KIAH, no ptosis  V     -     Normal facial sensation  VII    -     Normal facial symmetry  VIII   -     Intact hearing  IX,X -     Symmetrical palate  XI    -     Symmetrical shoulder shrug  XII   -     Midline tongue, no atrophy    MOTOR FUNCTION:  significant for good strength of grade 5/5 in bilateral proximal and distal muscle groups of both upper and lower extremities with normal bulk, normal tone and no involuntary movements, no tremor   SENSORY FUNCTION:  Impaired pinprick, temperature and vibration in both lower extremities distally with mild stocking pattern. CEREBELLAR FUNCTION:  Intact fine motor control over upper limbs   REFLEX FUNCTION:  Symmetric 1+ DTRs in both upper and lower extremities with bilateral flexor plantar response. STATION and GAIT  Normal station, wide-based gait and has difficulty with tandem gait.          Diagnostic data reviewed with the patient:   MRI lumbar spine (w/wo) 1/11/2020: No significant lumbar disc protrusion, spinal canal or neuroforaminal narrowing. No acute abnormality in the lumbar spine. No areas of abnormal enhancement. No results found for: [id number]   No results found for: FOLATE  No results found for: CINDY  No results found for: TSH, Y6POLXR, J1MFNOR, THYROIDAB    No results found for: LABA1C          Impression and Plan: Mr. Patricia Chase is a 26 y.o. 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 He would also benefit from NCS/EMG of bilateral lower extremities in further eval.  Also discussed about referral to physical therapy for gait strengthening and he stated that he already has been to therapy twice. Also discussed about gabapentin for neuropathy care pain relief and presently he does not want to be initiated on any medications and he wants etiologic testing done prior to initiation of any medications. Follow-up in clinic once the above testing is done. Please note that portions of this note were completed with a voice recognition program.  Although every effort was made to ensure the accuracy of this automated transcription, some errors in transcription may have occurred; occasionally words are mis-transcribed. Thank you for understanding.

## 2020-01-22 NOTE — LETTER
numbness and tingling sensation in bilateral lower extremities as stated above. Also admits to mood disorder but denies anxiety, depression and suicidal ideations. Mitts to back pain and gait difficulties, etc. as stated above. No current outpatient medications on file prior to visit. No current facility-administered medications on file prior to visit. Allergies: Rosa Gutierrez has No Known Allergies. Past Medical History:   Diagnosis Date    Memory disorder     short term    Movement disorder     twitching    Neuropathy     Seizures ([address]     once       Past Surgical History:   Procedure Laterality Date    TOE SURGERY Right 2019    tendon release, tend to last toe     Social History: Rosa Gutierrez  reports that he quit smoking about a year ago. His smoking use included cigarettes. He has a 5.00 pack-year smoking history. He has never used smokeless tobacco. He reports current alcohol use. He reports previous drug use. Family History   Problem Relation Age of Onset    Seizures Mother     Mult Sclerosis Sister     Diabetes Maternal Grandmother        On exam: Blood pressure 126/73, pulse 77, height 6' 1\" (1.854 m), weight 200 lb 3.2 oz (90.8 kg). NEUROLOGIC EXAMINATION  GENERAL  Appears comfortable and in no distress   HEENT  NC/ AT   NECK  Supple and no bruits heard   MENTAL STATUS:  Alert, oriented, intact memory, no confusion, normal speech, normal language, no hallucination or delusion   CRANIAL NERVES: II     -      PERRLA, Fundi reveal intact venous pulsations;  Visual fields intact to confrontation  III,IV,VI -  EOMs full, no afferent defect, no  KIAH, no ptosis  V     -     Normal facial sensation  VII    -     Normal facial symmetry  VIII   -     Intact hearing  IX,X -     Symmetrical palate  XI    -     Symmetrical shoulder shrug  XII   -     Midline tongue, no atrophy    MOTOR FUNCTION:  significant for good strength of grade 5/5 in bilateral proximal and distal muscle groups of both upper and lower extremities with normal bulk, normal tone and no involuntary movements, no tremor   SENSORY FUNCTION:  Impaired pinprick, temperature and vibration in both lower extremities distally with mild stocking pattern. CEREBELLAR FUNCTION:  Intact fine motor control over upper limbs   REFLEX FUNCTION:  Symmetric 1+ DTRs in both upper and lower extremities with bilateral flexor plantar response. STATION and GAIT  Normal station, wide-based gait and has difficulty with tandem gait. Diagnostic data reviewed with the patient:   MRI lumbar spine (w/wo) 1/11/2020: No significant lumbar disc protrusion, spinal canal or neuroforaminal narrowing. No acute abnormality in the lumbar spine. No areas of abnormal enhancement. No results found for: [id number]   No results found for: FOLATE  No results found for: CINDY  No results found for: TSH, Z5QEKII, Y0FSUNV, THYROIDAB    No results found for: LABA1C          Impression and Plan: Mr. Jayashree Smith is a 26 y.o. male with   Four-six month hx of paresthesias in both lower extremities; examination significant for sensory impairment to pinprick, temperature and vibration in proximaldistal gradient in lower extremities; hx of alcoholism  Presentation is suggestive of sensory peripheral polyneuropathy; question alcoholic neuropathy; discussion done about alcohol cessation; but he also needs vit B12, folate, TSH, CINDY, etc in treatable etiology. He would also benefit from NCS/EMG of bilateral lower extremities in further eval.  Also discussed about referral to physical therapy for gait strengthening and he stated that he already has been to therapy twice. Also discussed about gabapentin for neuropathy care pain relief and presently he does not want to be initiated on any medications and he wants etiologic testing done prior to initiation of any medications. Follow-up in clinic once the above testing is done. Please note that portions of this note were completed with a voice recognition program.  Although every effort was made to ensure the accuracy of this automated transcription, some errors in transcription may have occurred; occasionally words are mis-transcribed. Thank you for understanding. If you have questions, please do not hesitate to call me. I look forward to following Art Nim along with you.     Sincerely,        Jordyn Olivares MD

## 2020-01-23 ENCOUNTER — HOSPITAL ENCOUNTER (OUTPATIENT)
Age: 30
Setting detail: SPECIMEN
Discharge: HOME | End: 2020-01-23
Payer: COMMERCIAL

## 2020-01-23 LAB
ABSOLUTE EOS #: 0.14 K/UL (ref 0–0.44)
ABSOLUTE IMMATURE GRANULOCYTE: <0.03 K/UL (ref 0–0.3)
ABSOLUTE LYMPH #: 2.06 K/UL (ref 1.1–3.7)
ABSOLUTE MONO #: 0.65 K/UL (ref 0.1–1.2)
ALBUMIN SERPL-MCNC: 4.2 G/DL (ref 3.5–5.2)
ALBUMIN/GLOBULIN RATIO: 1.5 (ref 1–2.5)
ALP BLD-CCNC: 52 U/L (ref 40–129)
ALT SERPL-CCNC: 10 U/L (ref 5–41)
ANION GAP SERPL CALCULATED.3IONS-SCNC: 13 MMOL/L (ref 9–17)
AST SERPL-CCNC: 16 U/L
BASOPHILS # BLD: 0 % (ref 0–2)
BASOPHILS ABSOLUTE: 0.03 K/UL (ref 0–0.2)
BILIRUB SERPL-MCNC: 0.76 MG/DL (ref 0.3–1.2)
BILIRUBIN URINE: NEGATIVE
BUN BLDV-MCNC: 13 MG/DL (ref 6–20)
BUN/CREAT BLD: ABNORMAL (ref 9–20)
C-REACTIVE PROTEIN: 0.6 MG/L (ref 0–5)
CALCIUM SERPL-MCNC: 9.4 MG/DL (ref 8.6–10.4)
CHLORIDE BLD-SCNC: 104 MMOL/L (ref 98–107)
CHOLESTEROL/HDL RATIO: 2.1
CHOLESTEROL: 174 MG/DL
CO2: 26 MMOL/L (ref 20–31)
COLOR: YELLOW
COMMENT UA: NORMAL
CREAT SERPL-MCNC: 0.57 MG/DL (ref 0.7–1.2)
DIFFERENTIAL TYPE: ABNORMAL
EOSINOPHILS RELATIVE PERCENT: 2 % (ref 1–4)
ESTIMATED AVERAGE GLUCOSE: 97 MG/DL
FOLATE: >20 NG/ML
GFR AFRICAN AMERICAN: >60 ML/MIN
GFR NON-AFRICAN AMERICAN: >60 ML/MIN
GFR SERPL CREATININE-BSD FRML MDRD: ABNORMAL ML/MIN/{1.73_M2}
GFR SERPL CREATININE-BSD FRML MDRD: ABNORMAL ML/MIN/{1.73_M2}
GLUCOSE BLD-MCNC: 92 MG/DL (ref 70–99)
GLUCOSE URINE: NEGATIVE
HBA1C MFR BLD: 5 % (ref 4–6)
HCT VFR BLD CALC: 40.4 % (ref 40.7–50.3)
HDLC SERPL-MCNC: 83 MG/DL
HEMOGLOBIN: 12.4 G/DL (ref 13–17)
IMMATURE GRANULOCYTES: 0 %
KETONES, URINE: NEGATIVE
LDL CHOLESTEROL: 77 MG/DL (ref 0–130)
LEUKOCYTE ESTERASE, URINE: NEGATIVE
LYMPHOCYTES # BLD: 27 % (ref 24–43)
MCH RBC QN AUTO: 29 PG (ref 25.2–33.5)
MCHC RBC AUTO-ENTMCNC: 30.7 G/DL (ref 28.4–34.8)
MCV RBC AUTO: 94.4 FL (ref 82.6–102.9)
MONOCYTES # BLD: 8 % (ref 3–12)
NITRITE, URINE: NEGATIVE
NRBC AUTOMATED: 0 PER 100 WBC
PDW BLD-RTO: 13.5 % (ref 11.8–14.4)
PH UA: 5.5 (ref 5–8)
PLATELET # BLD: 289 K/UL (ref 138–453)
PLATELET ESTIMATE: ABNORMAL
PMV BLD AUTO: 11.8 FL (ref 8.1–13.5)
POTASSIUM SERPL-SCNC: 4.1 MMOL/L (ref 3.7–5.3)
PROTEIN UA: NEGATIVE
RBC # BLD: 4.28 M/UL (ref 4.21–5.77)
RBC # BLD: ABNORMAL 10*6/UL
SEDIMENTATION RATE, ERYTHROCYTE: 5 MM (ref 0–10)
SEG NEUTROPHILS: 63 % (ref 36–65)
SEGMENTED NEUTROPHILS ABSOLUTE COUNT: 4.88 K/UL (ref 1.5–8.1)
SODIUM BLD-SCNC: 143 MMOL/L (ref 135–144)
SPECIFIC GRAVITY UA: 1.03 (ref 1–1.03)
TOTAL PROTEIN: 7 G/DL (ref 6.4–8.3)
TRIGL SERPL-MCNC: 71 MG/DL
TSH SERPL DL<=0.05 MIU/L-ACNC: 0.97 MIU/L (ref 0.3–5)
TURBIDITY: CLEAR
URINE HGB: NEGATIVE
UROBILINOGEN, URINE: NORMAL
VITAMIN B-12: 518 PG/ML (ref 232–1245)
VLDLC SERPL CALC-MCNC: NORMAL MG/DL (ref 1–30)
WBC # BLD: 7.8 K/UL (ref 3.5–11.3)
WBC # BLD: ABNORMAL 10*3/UL

## 2020-01-31 ENCOUNTER — OFFICE VISIT (OUTPATIENT)
Dept: INTERNAL MEDICINE CLINIC | Age: 30
End: 2020-01-31
Payer: COMMERCIAL

## 2020-01-31 VITALS
WEIGHT: 198 LBS | OXYGEN SATURATION: 97 % | DIASTOLIC BLOOD PRESSURE: 72 MMHG | SYSTOLIC BLOOD PRESSURE: 116 MMHG | HEART RATE: 80 BPM | HEIGHT: 73 IN | RESPIRATION RATE: 18 BRPM | BODY MASS INDEX: 26.24 KG/M2

## 2020-01-31 PROBLEM — R29.898 WEAKNESS OF BOTH LOWER EXTREMITIES: Status: ACTIVE | Noted: 2020-01-31

## 2020-01-31 PROBLEM — R20.2 NUMBNESS AND TINGLING OF LEG: Status: ACTIVE | Noted: 2020-01-31

## 2020-01-31 PROBLEM — R20.0 NUMBNESS AND TINGLING OF LEG: Status: ACTIVE | Noted: 2020-01-31

## 2020-01-31 PROCEDURE — 99213 OFFICE O/P EST LOW 20 MIN: CPT | Performed by: PHYSICIAN ASSISTANT

## 2020-01-31 ASSESSMENT — ENCOUNTER SYMPTOMS
SHORTNESS OF BREATH: 0
NAUSEA: 0
ABDOMINAL PAIN: 0
CONSTIPATION: 0
DIARRHEA: 0
COUGH: 0
CHEST TIGHTNESS: 0
BLOOD IN STOOL: 0
VOMITING: 0
BACK PAIN: 0

## 2020-01-31 NOTE — PROGRESS NOTES
August Ramirez  reports that he quit smoking about a year ago. His smoking use included cigarettes. He has a 5.00 pack-year smoking history. He has never used smokeless tobacco.    FAMILY HISTORY:    Reviewed and No change from previous visit    REVIEW OF SYSTEMS:    Review of Systems   Constitutional: Negative for chills, fatigue and fever. Eyes: Negative for visual disturbance. Respiratory: Negative for cough, chest tightness and shortness of breath. Cardiovascular: Negative for chest pain and leg swelling. Gastrointestinal: Negative for abdominal pain, blood in stool, constipation, diarrhea, nausea and vomiting. Genitourinary: Negative for difficulty urinating, dysuria, flank pain, frequency, hematuria and urgency. Musculoskeletal: Positive for arthralgias. Negative for back pain, neck pain and neck stiffness. Skin: Negative for rash. Neurological: Positive for weakness and numbness (tingling). Negative for dizziness, tremors, seizures, syncope, speech difficulty, light-headedness and headaches. Hematological: Negative for adenopathy.      PHYSICAL EXAM:      Vitals:    01/31/20 1332   BP: 116/72   Pulse: 80   Resp: 18   SpO2: 97%   Weight: 198 lb (89.8 kg)   Height: 6' 0.99\" (1.854 m)     BP Readings from Last 3 Encounters:   01/31/20 116/72   01/22/20 126/73   01/03/20 (!) 147/79      Wt Readings from Last 3 Encounters:   01/31/20 198 lb (89.8 kg)   01/22/20 200 lb 3.2 oz (90.8 kg)   01/03/20 191 lb 6.4 oz (86.8 kg)     General - alert, well appearing, and in no distress  Skin - normal coloration and turgor, no rashes  Eyes - pupils equal and reactive, extraocular eye movements intact  Mouth - mucous membranes moist  Neck - supple, no significant adenopathy, no palpable masses  Chest - clear to auscultation, symmetric air entry  Heart - normal rate, regular rhythm, no murmur  Abdomen - non distended, soft, no tenderness to palpation   Back - no flank tenderness bilaterally   Neurological -alert, oriented, normal speech, no focal sensory or motor deficit, cranial nerve exam without deficit  Extremities - peripheral pulses normal, no pedal edema    LABORATORY FINDINGS:    CBC:  Lab Results   Component Value Date    WBC 7.8 01/23/2020    HGB 12.4 01/23/2020     01/23/2020     BMP:    Lab Results   Component Value Date     01/23/2020    K 4.1 01/23/2020     01/23/2020    CO2 26 01/23/2020    BUN 13 01/23/2020    CREATININE 0.57 01/23/2020    GLUCOSE 92 01/23/2020     HEMOGLOBIN A1C:   Lab Results   Component Value Date    LABA1C 5.0 01/23/2020     FASTING LIPID PANEL:  Lab Results   Component Value Date    CHOL 174 01/23/2020    HDL 83 01/23/2020    TRIG 71 01/23/2020     ASSESSMENT AND PLAN:      1. Weakness of both lower extremities  - Reviewed MRI, no significant spinal canal or neural foraminal narrowing  - Advised to complete testing per neurologist, will add physical therapy for strength training   -- [address]    2. Numbness and tingling of leg  - Unspecified etiology, labs unremarkable, MRI without stenosis  - Advised to complete testing per neurologist, EMG pending    3. Alcohol use disorder, mild, abuse  - Counseling on importance of cessation, risks of continued use    FOLLOW UP AND INSTRUCTIONS:   Return in about 3 months (around 4/30/2020). Yaquelin Cao received counseling on the following healthy behaviors: nutrition, exercise, medication adherence and decrease in alcohol consumption    Discussed use, benefit, and side effects of prescribed medications. Barriers to medication compliance addressed. All patient questions answered. Patient voiced understanding. Patient given educational materials - see patient instructions    Shade GLASGOW [facility]  1/31/2020, 3:04 PM    Please note that this chart was generated using voice recognition Dragon dictation software.   Although everyeffort was made to ensure the accuracy of this automated transcription, some errors in transcription may have occurred.

## 2020-01-31 NOTE — PROGRESS NOTES
Visit Information    Have you changed or started any medications since your last visit including any over-the-counter medicines, vitamins, or herbal medicines? no   Are you having any side effects from any of your medications? -  no  Have you stopped taking any of your medications? Is so, why? -  no    Have you seen any other physician or provider since your last visit? Yes - Records Requested  Have you had any other diagnostic tests since your last visit? Yes - Records Requested  Have you been seen in the emergency room and/or had an admission to a hospital since we last saw you? No  Have you had your routine dental cleaning in the past 6 months? yes -     Have you activated your [employer] account? If not, what are your barriers? No:      Patient Care Team:  Radha Vargas PA-C as PCP - General (Physician Assistant)  Radha Vargas PA-C as PCP - [facility]    Medical History Review  Past Medical, Family, and Social History reviewed and does contribute to the patient presenting condition    Health Maintenance   Topic Date Due    Varicella Vaccine (1 of 2 - 2-dose childhood series) 06/07/1990    Pneumococcal 0-64 years Vaccine (1 of 1 - PPSV23) 06/07/1995    DTaP/Tdap/Td vaccine (1 - Tdap) 06/07/2000    Flu vaccine (1) 01/03/2021 (Originally 9/1/2019)    HIV screen  Completed     Chief Complaint   Patient presents with    Results     MRI    Knee Pain     bilateral knee pain. Pt completed physical therapy and doing home exercises. Pt states pain is getting better but still some pain and swelling.     Health Maintenance     due dtap and varicella

## 2020-05-17 ENCOUNTER — APPOINTMENT (OUTPATIENT)
Dept: CT IMAGING | Age: 30
End: 2020-05-17
Payer: COMMERCIAL

## 2020-05-17 ENCOUNTER — HOSPITAL ENCOUNTER (EMERGENCY)
Age: 30
Discharge: HOME | End: 2020-05-17
Attending: EMERGENCY MEDICINE
Payer: COMMERCIAL

## 2020-05-17 VITALS
OXYGEN SATURATION: 100 % | RESPIRATION RATE: 16 BRPM | DIASTOLIC BLOOD PRESSURE: 91 MMHG | WEIGHT: 201 LBS | BODY MASS INDEX: 25.8 KG/M2 | SYSTOLIC BLOOD PRESSURE: 151 MMHG | HEIGHT: 74 IN | TEMPERATURE: 99 F | HEART RATE: 101 BPM

## 2020-05-17 PROCEDURE — 72125 CT NECK SPINE W/O DYE: CPT

## 2020-05-17 PROCEDURE — 70486 CT MAXILLOFACIAL W/O DYE: CPT

## 2020-05-17 PROCEDURE — 99284 EMERGENCY DEPT VISIT MOD MDM: CPT

## 2020-05-17 PROCEDURE — 70450 CT HEAD/BRAIN W/O DYE: CPT

## 2020-05-17 RX ORDER — IBUPROFEN 800 MG/1
800 TABLET ORAL EVERY 6 HOURS PRN
Qty: 21 TABLET | Refills: 0 | Status: SHIPPED | OUTPATIENT
Start: 2020-05-17

## 2020-05-17 RX ORDER — ACETAMINOPHEN AND CODEINE PHOSPHATE 300; 30 MG/1; MG/1
1 TABLET ORAL 3 TIMES DAILY PRN
Qty: 12 TABLET | Refills: 0 | Status: SHIPPED | OUTPATIENT
Start: 2020-05-17 | End: 2020-05-20

## 2020-05-17 ASSESSMENT — PAIN SCALES - GENERAL: PAINLEVEL_OUTOF10: 5

## 2020-05-17 ASSESSMENT — ENCOUNTER SYMPTOMS
WHEEZING: 0
DIARRHEA: 0
CONSTIPATION: 0
VOMITING: 0
SORE THROAT: 0
FACIAL SWELLING: 1
ABDOMINAL PAIN: 0
RHINORRHEA: 0
SHORTNESS OF BREATH: 0
SINUS PRESSURE: 0
COUGH: 0
NAUSEA: 0
COLOR CHANGE: 0
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
## 2020-07-30 ENCOUNTER — OFFICE VISIT (OUTPATIENT)
Dept: NEUROLOGY | Age: 30
End: 2020-07-30
Payer: COMMERCIAL

## 2020-07-30 VITALS
HEART RATE: 67 BPM | HEIGHT: 73 IN | BODY MASS INDEX: 25.98 KG/M2 | DIASTOLIC BLOOD PRESSURE: 68 MMHG | SYSTOLIC BLOOD PRESSURE: 114 MMHG | WEIGHT: 196 LBS

## 2020-07-30 PROBLEM — M79.672 PAIN IN BOTH FEET: Status: ACTIVE | Noted: 2020-07-30

## 2020-07-30 PROBLEM — M79.671 PAIN IN BOTH FEET: Status: ACTIVE | Noted: 2020-07-30

## 2020-07-30 PROCEDURE — G8419 CALC BMI OUT NRM PARAM NOF/U: HCPCS | Performed by: PSYCHIATRY & NEUROLOGY

## 2020-07-30 PROCEDURE — G8427 DOCREV CUR MEDS BY ELIG CLIN: HCPCS | Performed by: PSYCHIATRY & NEUROLOGY

## 2020-07-30 PROCEDURE — 1036F TOBACCO NON-USER: CPT | Performed by: PSYCHIATRY & NEUROLOGY

## 2020-07-30 PROCEDURE — 99214 OFFICE O/P EST MOD 30 MIN: CPT | Performed by: PSYCHIATRY & NEUROLOGY

## 2020-07-30 RX ORDER — GABAPENTIN 100 MG/1
CAPSULE ORAL
Qty: 60 CAPSULE | Refills: 1 | Status: SHIPPED | OUTPATIENT
Start: 2020-07-30 | End: 2021-07-30

## 2020-07-30 NOTE — LETTER
[id number] [facility] Neurology Specialist  [place] [address] [place] [address]  Phone: [phone]  Fax: [phone]    Basilio Argueta MD        2020     Makeda Quinteros, [facility] [address]    Patient: Chioma Lawler  MR Number: [id number]  YOB: 1990  Date of Visit: 2020    Dear Dr. Makeda Quinteros: Thank you for the request for consultation for Gely Mccormicktravon to me for the evaluation of Starlet Anai  Below are the relevant portions of my assessment and plan of care. NEUROLOGY FOLLOW-UP  Patient Name:  Chioma Lawler  :   1990  Clinic Visit Date: 2020        REVIEW OF SYSTEMS  Constitutional Weight changes: absent, Fevers : absent, Fatigue: absent; Any recent hospitalizations:  absent.    HEENT Ears: normal, Eyes: glasses, Visual disturbance: absent   Reespiratory Shortness of breath: absent, Cough: absent   Cardivascular Chest pain: absent, Leg swelling :absent   GI Constipation: absent, Diarrhea: absent, Swallowing change: absent    Urinary frequency: absent, Urinary urgency: absent, Urinary incontinence: absent   Musculoskeletal Neck pain: absent, Back pain: present, Stiffness: absent, Muscle pain: absent, Joint pain: absent   Dermatological Hair loss: absent, Skin changes: absent   Neurological Memory loss: absent, Confusion: absent, Seizures: present; Trouble walking or imbalance: present, Dizziness: absent, Weakness: present, Numbness present, Tremor: absent, Spasm: absent, Speech difficulty: absent, Headache: absent, Light sensitivity: absent   Psychiatric Anxiety: absent, Depression  absent, Suicidal ideations absent   Hematologic Abnormal bleeding: absent, Anemia: absent, Clotting disorder: absent, Lymph gland changes: absent           NEUROLOGY FOLLOW-UP VISIT   Patient Name:       Chioma Lawler  :        1990  Clinic Visit Date:    2020    Dear Dr. Makeda Quinteros PA-C I saw Mr. Christian Paiz in follow-up in the office today in continuation of neurologic care. As you know he  is a 31 y.o. left handed male initially seen in neurology consultation on 1/22/2020 with six month hx of numbness and tingling in both lower extremities. Today is his first follow up visit. He comes to the clinic stating that he has been having abnormal sensations with pain in bottom of right foot predominantly. He has been having aggravation of symptoms towards afternoon and evening. He has numbness and tingling sensation in both feet and lower legs since summer of 2019. He has been having occasional tremors in lower extremities also. He rarely has back pain radiating down lower extremities. He also stated that he has been having gait difficulties and has been to physical therapy. He has been using inserts in his shoes to help for walking. He has been avoiding any falls. Upon further questioning he admits to drinking 4 shots of liquor per week every week since age 23 for the past 10 years. He also admits to having some memory problems but he is completely independent of all ADLs. He has been driving with no problems. He also admits to having mood disorder and he is presently not on any medications. Denies anxiety and depression and suicidal ideations. Review of systems done by staff reviewed and pertinent positives include numbness and tingling sensation in bilateral lower extremities with painful sensations in both feet with occasional balance difficulties. Current Outpatient Medications on File Prior to Visit   Medication Sig Dispense Refill    ibuprofen (IBU) 800 MG tablet Take 1 tablet by mouth every 6 hours as needed for Pain 21 tablet 0     No current facility-administered medications on file prior to visit. Allergies: Christian Paiz has No Known Allergies.     Past Medical History:   Diagnosis Date    Memory disorder     short term    Movement disorder STATION and GAIT  Normal station, wide-based gait and has difficulty with tandem gait. Diagnostic data reviewed with the patient:   MRI lumbar spine (w/wo) 1/11/2020: No significant lumbar disc protrusion, spinal canal or neuroforaminal narrowing. No acute abnormality in the lumbar spine. No areas of abnormal enhancement. ESR (1/23/2020): 5  B12 level (1/23/2020): 518  Folate level (1/23/2020): >20  TSH (1/23/2020): 0.97  Hemoglobin A1c (1/23/2020): 5.0            Impression and Plan: Mr. Silverio Carrillo is a 31 y.o. male with   Paresthesia and dysesthesias in bilateral lower extremities \"since summer of 2019\"  Exam significant for mild sensory impairment to temp, pin prick and vibration in distal lower extremities  Hx of alcoholism  B12, folate level, TSH are unremarkable. Has had PT with some improvement in gait; he was advised to continue those instructions. For dysesthesias: He was advised to take gabapentin 100 mg bid for neuropathic pain relief; also to get NCS/ EMG of bilateral LE in further evaluation of neuropathy. Pes planus, congenital bilateral feet: On custom-made orthotics; continue follow-up visits with podiatrist.  Follow up in clinic after EMG testing done. Please note that portions of this note were completed with a voice recognition program.  Although every effort was made to ensure the accuracy of this automated transcription, some errors in transcription may have occurred; occasionally words are mis-transcribed. Thank you for understanding. If you have questions, please do not hesitate to call me. I look forward to following Andrew Crenshaw along with you.     Sincerely,        Risa Roberts MD

## 2020-07-30 NOTE — PROGRESS NOTES
NEUROLOGY FOLLOW-UP VISIT   Patient Name:       Chioma Lawler  :        1990  Clinic Visit Date:    2020    Dear Dr. Makeda Quinteros PA-C     I saw Mr. Chioma Lawler in follow-up in the office today in continuation of neurologic care. As you know he  is a 31 y.o. left handed male initially seen in neurology consultation on 2020 with six month hx of numbness and tingling in both lower extremities. Today is his first follow up visit. He comes to the clinic stating that he has been having abnormal sensations with pain in bottom of right foot predominantly. He has been having aggravation of symptoms towards afternoon and evening. He has numbness and tingling sensation in both feet and lower legs since summer of 2019. He has been having occasional tremors in lower extremities also. He rarely has back pain radiating down lower extremities. He also stated that he has been having gait difficulties and has been to physical therapy. He has been using inserts in his shoes to help for walking. He has been avoiding any falls. Upon further questioning he admits to drinking 4 shots of liquor per week every week since age 23 for the past 10 years. He also admits to having some memory problems but he is completely independent of all ADLs. He has been driving with no problems. He also admits to having mood disorder and he is presently not on any medications. Denies anxiety and depression and suicidal ideations. Review of systems done by staff reviewed and pertinent positives include numbness and tingling sensation in bilateral lower extremities with painful sensations in both feet with occasional balance difficulties. Current Outpatient Medications on File Prior to Visit   Medication Sig Dispense Refill    ibuprofen (IBU) 800 MG tablet Take 1 tablet by mouth every 6 hours as needed for Pain 21 tablet 0     No current facility-administered medications on file prior to visit. FUNCTION:  Symmetric 1+ DTRs in both upper and lower extremities with bilateral flexor plantar response. STATION and GAIT  Normal station, wide-based gait and has difficulty with tandem gait. Diagnostic data reviewed with the patient:   MRI lumbar spine (w/wo) 1/11/2020: No significant lumbar disc protrusion, spinal canal or neuroforaminal narrowing. No acute abnormality in the lumbar spine. No areas of abnormal enhancement. ESR (1/23/2020): 5  B12 level (1/23/2020): 518  Folate level (1/23/2020): >20  TSH (1/23/2020): 0.97  Hemoglobin A1c (1/23/2020): 5.0            Impression and Plan: Mr. Esteban Archuleta is a 31 y.o. male with   Paresthesia and dysesthesias in bilateral lower extremities \"since summer of 2019\"  Exam significant for mild sensory impairment to temp, pin prick and vibration in distal lower extremities  Hx of alcoholism  B12, folate level, TSH are unremarkable. Has had PT with some improvement in gait; he was advised to continue those instructions. For dysesthesias: He was advised to take gabapentin 100 mg bid for neuropathic pain relief; also to get NCS/ EMG of bilateral LE in further evaluation of neuropathy. Pes planus, congenital bilateral feet: On custom-made orthotics; continue follow-up visits with podiatrist.  Follow up in clinic after EMG testing done. Please note that portions of this note were completed with a voice recognition program.  Although every effort was made to ensure the accuracy of this automated transcription, some errors in transcription may have occurred; occasionally words are mis-transcribed. Thank you for understanding.

## 2020-07-30 NOTE — PROGRESS NOTES
NEUROLOGY FOLLOW-UP  Patient Name:  Krissy Morton  :   1990  Clinic Visit Date: 2020        REVIEW OF SYSTEMS  Constitutional Weight changes: absent, Fevers : absent, Fatigue: absent; Any recent hospitalizations:  absent.    HEENT Ears: normal, Eyes: glasses, Visual disturbance: absent   Reespiratory Shortness of breath: absent, Cough: absent   Cardivascular Chest pain: absent, Leg swelling :absent   GI Constipation: absent, Diarrhea: absent, Swallowing change: absent    Urinary frequency: absent, Urinary urgency: absent, Urinary incontinence: absent   Musculoskeletal Neck pain: absent, Back pain: present, Stiffness: absent, Muscle pain: absent, Joint pain: absent   Dermatological Hair loss: absent, Skin changes: absent   Neurological Memory loss: absent, Confusion: absent, Seizures: present; Trouble walking or imbalance: present, Dizziness: absent, Weakness: present, Numbness present, Tremor: absent, Spasm: absent, Speech difficulty: absent, Headache: absent, Light sensitivity: absent   Psychiatric Anxiety: absent, Depression  absent, Suicidal ideations absent   Hematologic Abnormal bleeding: absent, Anemia: absent, Clotting disorder: absent, Lymph gland changes: absent

## 2020-07-31 ENCOUNTER — OFFICE VISIT (OUTPATIENT)
Dept: INTERNAL MEDICINE CLINIC | Age: 30
End: 2020-07-31
Payer: COMMERCIAL

## 2020-07-31 VITALS
HEART RATE: 62 BPM | OXYGEN SATURATION: 98 % | DIASTOLIC BLOOD PRESSURE: 76 MMHG | BODY MASS INDEX: 25.74 KG/M2 | TEMPERATURE: 97.7 F | HEIGHT: 73 IN | SYSTOLIC BLOOD PRESSURE: 110 MMHG | WEIGHT: 194.2 LBS

## 2020-07-31 PROCEDURE — G8427 DOCREV CUR MEDS BY ELIG CLIN: HCPCS | Performed by: PHYSICIAN ASSISTANT

## 2020-07-31 PROCEDURE — 1036F TOBACCO NON-USER: CPT | Performed by: PHYSICIAN ASSISTANT

## 2020-07-31 PROCEDURE — 99213 OFFICE O/P EST LOW 20 MIN: CPT | Performed by: PHYSICIAN ASSISTANT

## 2020-07-31 PROCEDURE — G8419 CALC BMI OUT NRM PARAM NOF/U: HCPCS | Performed by: PHYSICIAN ASSISTANT

## 2020-07-31 NOTE — PROGRESS NOTES
every 6 hours as needed for Pain (Patient not taking: Reported on 7/31/2020) 21 tablet 0     No current facility-administered medications for this visit. ALLERGIES:    No Known Allergies      SOCIAL HISTORY    Reviewed and no change from previous record. Lucrecia Sheets  reports that he quit smoking about 18 months ago. His smoking use included cigarettes. He has a 5.00 pack-year smoking history. He has never used smokeless tobacco.    FAMILY HISTORY:    Reviewed and no change from previous visit    REVIEW OF SYSTEMS:    Review of Systems   Constitutional: Negative for chills, fatigue and fever. Eyes: Negative for visual disturbance. Respiratory: Negative for cough, chest tightness and shortness of breath. Cardiovascular: Negative for chest pain and leg swelling. Gastrointestinal: Negative for abdominal pain, blood in stool, constipation, diarrhea, nausea and vomiting. Genitourinary: Negative for difficulty urinating, dysuria, flank pain, frequency, hematuria and urgency. Musculoskeletal: Positive for arthralgias. Negative for back pain, neck pain and neck stiffness. Skin: Negative for rash. Neurological: Positive for weakness and numbness (tingling). Negative for dizziness, tremors, seizures, syncope, speech difficulty, light-headedness and headaches. Hematological: Negative for adenopathy.      PHYSICAL EXAM:      Vitals:    07/31/20 1217   BP: 110/76   Pulse: 62   Temp: 97.7 °F (36.5 °C)   SpO2: 98%   Weight: 194 lb 3.2 oz (88.1 kg)   Height: 6' 1\" (1.854 m)     BP Readings from Last 3 Encounters:   07/31/20 110/76   07/30/20 114/68   05/17/20 (!) 151/91      Wt Readings from Last 3 Encounters:   07/31/20 194 lb 3.2 oz (88.1 kg)   07/30/20 196 lb (88.9 kg)   05/17/20 201 lb (91.2 kg)     General - alert, well appearing, and in no distress  Skin - normal coloration and turgor, no rash  Eyes - pupils equal and reactive, extraocular eye movements intact  Mouth - mucous membranes are moist, pharynx normal without lesions  Neck - supple, no significant adenopathy, no palpable masses   Lymphatics - no palpable lymphadenopathy, no hepatosplenomegaly  Chest - clear to auscultation, no wheezes, rales or rhonchi, symmetric air entry  Heart - normal rate, rhythm is regular, no murmurs, rubs, clicks or gallops  Abdomen - soft, nontender, nondistended, no masses or organomegaly  Back - full range of motion, no tenderness, palpable spasm or pain on motion  Neurological - alert, oriented x 3, normal speech, no facial droop, no focal sensory or motor deficit, cn ll-Xll intact, no cerebellar signs, normal gait   Extremities - peripheral pulses normal, no pedal edema, corn right plantar foot, point tender     LABORATORY FINDINGS:    CBC:  Lab Results   Component Value Date    WBC 7.8 01/23/2020    HGB 12.4 01/23/2020     01/23/2020     BMP:    Lab Results   Component Value Date     01/23/2020    K 4.1 01/23/2020     01/23/2020    CO2 26 01/23/2020    BUN 13 01/23/2020    CREATININE 0.57 01/23/2020    GLUCOSE 92 01/23/2020     HEMOGLOBIN A1C:   Lab Results   Component Value Date    LABA1C 5.0 01/23/2020     FASTING LIPID PANEL:  Lab Results   Component Value Date    CHOL 174 01/23/2020    HDL 83 01/23/2020    TRIG 71 01/23/2020     ASSESSMENT AND PLAN:      1. Leg paresthesia  - Following with neurologist, labs, EMG lower extremities pending    2. [place] of foot  - [facility] [place], [place]    3. Pes planus of both feet   - [facility]upa Saldana DPM, Podiatry, [place]    FOLLOW [place] AND INSTRUCTIONS:   Return in about 6 months (around 1/31/2021). Chema Alvarez received counseling on the following healthy behaviors: nutrition, exercise, medication adherence and decrease in alcohol consumption    Discussed use, benefit, and side effects of prescribed medications. Barriers to medication compliance addressed. All patient questions answered. Patient voiced understanding.      Patient given educational materials - see patient instructions    RAH Rodgers [facility]  8/3/2020, 8:44 AM    Please note that this chart was generated using voice recognition Dragon dictation software. Although everyeffort was made to ensure the accuracy of this automated transcription, some errors in transcription may have occurred.

## 2020-07-31 NOTE — PATIENT INSTRUCTIONS
Patient Education        Corns and Calluses: Care Instructions  Your Care Instructions  Corns and calluses are areas of thick, hard, dead skin. They form to protect your skin from injury. Corns usually form where toes rub together. Calluses often form on the hands or feet. They may form wherever the skin rubs against something, such as shoes. In most cases, you can take steps at home to care for a corn or callus. Follow-up care is a key part of your treatment and safety. Be sure to make and go to all appointments, and call your doctor if you are having problems. It's also a good idea to know your test results and keep a list of the medicines you take. How can you care for yourself at home? · Wear shoes and other footwear that fit correctly and are roomy. This will reduce rubbing and give corns or calluses time to heal.  · Use protective pads, such as moleskin, to cushion the callus or corn. · Soften the corn or callus and remove the dead skin by using over-the-counter products such as salicylic acid. If you have a condition that causes problems with blood flow (such as peripheral vascular disease) or loss of feeling in your feet (such as diabetes), talk to your doctor before you try any home treatment. · Soak your corn or callus in warm water, and then use a pumice stone to rub dead skin away. · Wash your feet regularly, and rub lotion into your feet while they are still moist. Dry skin can cause a callus to crack and bleed. · Never cut the corn or callus yourself, especially if you have problems with blood flow to your legs or feet. When should you call for help? Call your doctor now or seek immediate medical care if:  · You have signs of infection, such as:  ? Increased pain, swelling, warmth, or redness around the corn or callus. ? Red streaks leading from the corn or callus. ? Pus draining from the corn or callus. ? A fever.   Watch closely for changes in your health, and be sure to contact your doctor if:  · You do not get better as expected. Where can you learn more? Go to https://chpepiceweb.[employer]. org and sign in to your "[employer] account. Enter R244 in the [facility] box to learn more about \"Corns and Calluses: Care Instructions. \"     If you do not have an account, please click on the \"Sign Up Now\" link. Current as of: October 31, 2019               Content Version: 12.5  © 1741-2359 [employer]. Care instructions adapted under license by [facility] ([facility] [place]). If you have questions about a medical condition or this instruction, always ask your healthcare professional. [address] any warranty or liability for your use of this information.

## 2020-08-03 ASSESSMENT — ENCOUNTER SYMPTOMS
COUGH: 0
CHEST TIGHTNESS: 0
VOMITING: 0
CONSTIPATION: 0
DIARRHEA: 0
SHORTNESS OF BREATH: 0
BACK PAIN: 0
ABDOMINAL PAIN: 0
BLOOD IN STOOL: 0
NAUSEA: 0

## 2020-09-17 ENCOUNTER — OFFICE VISIT (OUTPATIENT)
Dept: PODIATRY | Age: 30
End: 2020-09-17
Payer: COMMERCIAL

## 2020-09-17 VITALS — BODY MASS INDEX: 25.18 KG/M2 | HEIGHT: 73 IN | WEIGHT: 190 LBS

## 2020-09-17 PROCEDURE — G8419 CALC BMI OUT NRM PARAM NOF/U: HCPCS | Performed by: PODIATRIST

## 2020-09-17 PROCEDURE — G8427 DOCREV CUR MEDS BY ELIG CLIN: HCPCS | Performed by: PODIATRIST

## 2020-09-17 PROCEDURE — 99203 OFFICE O/P NEW LOW 30 MIN: CPT | Performed by: PODIATRIST

## 2020-09-17 PROCEDURE — 1036F TOBACCO NON-USER: CPT | Performed by: PODIATRIST

## 2020-09-17 PROCEDURE — 11055 PARING/CUTG B9 HYPRKER LES 1: CPT | Performed by: PODIATRIST

## 2020-09-17 ASSESSMENT — ENCOUNTER SYMPTOMS
SHORTNESS OF BREATH: 0
DIARRHEA: 0
NAUSEA: 0
BACK PAIN: 0
COLOR CHANGE: 0

## 2020-09-17 NOTE — PROGRESS NOTES
Cynthia Burgos is a 31 y.o. male who presents to the office today with chief complaint of pain to the bottom of the right foot at the ball of the foot. Chief Complaint   Patient presents with    Foot Pain     bottom of right foot, has been going on for years     Callouses     right right foot    Symptoms began about 2 year(s) ago. Patient denies injury to the right foot. Patient states that the pain is greatest with pressure. Pain is rated 9 out of 10 at it's worst and is described as intermittent. Treatments prior to today's visit include: Patient got orthotics from another doctor, but these did not help. No Known Allergies    Past Medical History:   Diagnosis Date    Memory disorder     short term    Movement disorder     twitching    Neuropathy     Seizures ([facility] [address]     once       Prior to Admission medications    Medication Sig Start Date End Date Taking? Authorizing Provider   gabapentin (NEURONTIN) 100 MG capsule Take one cap twice daily 20 Yes Diego Cavazos MD   ibuprofen (IBU) 800 MG tablet Take 1 tablet by mouth every 6 hours as needed for Pain 20  Yes ZEYNEP Aguilar - CNP       Past Surgical History:   Procedure Laterality Date    TOE SURGERY Right 2019    tendon release, tend to last toe       Family History   Problem Relation Age of Onset    Seizures Mother     Mult Sclerosis Sister     Diabetes Maternal Grandmother        Social History     Tobacco Use    Smoking status: Former Smoker     Packs/day: 1.00     Years: 5.00     Pack years: 5.00     Types: Cigarettes     Last attempt to quit: 2019     Years since quittin.6    Smokeless tobacco: Never Used   Substance Use Topics    Alcohol use:  Yes     Alcohol/week: 4.0 standard drinks     Types: 4 Shots of liquor per week     Frequency: 2-3 times a week     Comment: 3-4 days a week for the last 9 yr       Review of Systems   Constitutional: Negative for activity change, appetite change, chills, diaphoresis, fatigue and fever. Respiratory: Negative for shortness of breath. Cardiovascular: Negative for leg swelling. Gastrointestinal: Negative for diarrhea and nausea. Endocrine: Negative for cold intolerance, heat intolerance and polyuria. Musculoskeletal: Negative for arthralgias, back pain, gait problem, joint swelling and myalgias. Skin: Negative for color change, pallor, rash and wound. Allergic/Immunologic: Negative for environmental allergies and food allergies. Neurological: Negative for dizziness, weakness, light-headedness and numbness. Hematological: Does not bruise/bleed easily. Psychiatric/Behavioral: Negative for behavioral problems, confusion and self-injury. The patient is not nervous/anxious. Vitals: There were no vitals filed for this visit. General: AAO x 3 in NAD. Integument: There are no rashes, ulcers, or breaks in the skin noted to the bilateral lower extremities. There is no induration, subcutaneous nodules, or tightening of the skin noted to the bilateral.     Toenails 1-5 of the right foot do not present with thickness, elongation, discoloration, brittleness, subungual debris. Toenails 1-5 of the left foot do not present with thickness, elongation, discoloration, brittleness, subungual debris. Interdigital maceration absent to web spaces 1-4, Bilateral.     There are preulcerative lesions noted to the right foot submetatarsal head three. There is pain with direct palpation of this lesion. Debridement of this lesion with a fifteen blade reveals a central core. This core was also debrided with a fifteen blade. There are no signs of bacterial infection noted to this area. There are no preulcerative lesions noted to the left foot. The skin to the bilateral feet is not thin and shiny. The skin to the bilateral feet is  warm, supple, and dry. Vascular: DP pulse of the right foot is  palpable.      DP pulse of the left foot is palpable. PT pulse of the right foot is  palpable. PT pulse of the left foot is  palpable. CFT is less than 3 secs to the digits of the right foot. CFT is less than 3 secs to the digits of the left foot. There is no edema noted to the bilateral foot or ankle. There is hair growth noted to the digits of the bilateral feet. There are no varicosities noted to the right foot/ankle. There are no varicosities noted to the left foot/ankle. Erythema is absent to the bilateral feet. Neurological: Reflexes are present to the right plantar foot and to the Achilles tendon. Reflexes are present to the left plantar foot and to the Achilles tendon. Epicritic sensation is  intact to the right foot. Epicritic sensation is  intact to the left foot. Musculoskeletal:  Muscle strength is +5/5 to all four muscle groups of the right lower extremity and +5/5 to all four muscle groups of the left lower extremity. There are no areas of subluxation, dislocation, or laxity noted to either lower extremity. Range of motion to the right ankle is  free of pain or grinding. Range of motion to the left ankle is  free of pain or grinding. Range of motion to the right subtalar joint is  free of pain or grinding. Range of motion to the left subtalar joint is  free of pain or grinding. No abnormalities, asymmetries, or misalignments are seen between the extremities. Weightbearing evaluation does not reveal rearfoot eversion, medial prominence of the talar head, loss of the medial longitudinal arch height, and too many toes sign bilaterally. The digits of the right foot are contracted. The digits of the left foot are contracted. There is no prominence noted to the first metatarsal head without abduction of the hallux of the right foot. There is no prominence noted to the first metatarsal head without abduction of the hallux of the left foot.     Shoe examination was performed. Biomechanical Exam: normal bilaterally. Asessment: Patient is a 31 y.o. male with:    Diagnosis Orders   1. Corns and callosities  [place] TRIM HYPERKERATOTIC SKIN LESION, ONE   2. Pain in right foot  [place] TRIM HYPERKERATOTIC SKIN LESION, ONE       Plan:  1. Clinical evaluation of the patient. 2. The lesion(s) to the right foot debrided with a 15 blade without event. The area was treated with Phenol following debridement. 3. Contact office with any questions/problems/concerns.   Return in about 2 weeks (around 10/1/2020) for Painful callous(es).   9/17/2020      Florina Gandhi DPM

## 2020-10-01 ENCOUNTER — OFFICE VISIT (OUTPATIENT)
Dept: PODIATRY | Age: 30
End: 2020-10-01
Payer: COMMERCIAL

## 2020-10-01 VITALS — WEIGHT: 190 LBS | HEIGHT: 73 IN | BODY MASS INDEX: 25.18 KG/M2

## 2020-10-01 PROCEDURE — 11055 PARING/CUTG B9 HYPRKER LES 1: CPT | Performed by: PODIATRIST

## 2020-10-01 ASSESSMENT — ENCOUNTER SYMPTOMS
BACK PAIN: 0
COLOR CHANGE: 0
SHORTNESS OF BREATH: 0
DIARRHEA: 0
NAUSEA: 0

## 2020-10-01 NOTE — PROGRESS NOTES
[address] [place] [address] Podiatry  Return Patient Progress Note    Subjective: Naresh Maria 31 y.o. male that presents for follow up evaluation of painful callous to the right foot. Chief Complaint   Patient presents with   Cherylann Harsh     right foot     Patient's treatment thus far has included serial debridement. Pain is rated 6 out of 10 and is described as intermittent. Patient has been following my prescribed course of therapy as instructed. Review of Systems   Constitutional: Negative for activity change, appetite change, chills, diaphoresis, fatigue and fever. Respiratory: Negative for shortness of breath. Cardiovascular: Negative for leg swelling. Gastrointestinal: Negative for diarrhea and nausea. Endocrine: Negative for cold intolerance, heat intolerance and polyuria. Musculoskeletal: Negative for arthralgias, back pain, gait problem, joint swelling and myalgias. Skin: Negative for color change, pallor, rash and wound. Allergic/Immunologic: Negative for environmental allergies and food allergies. Neurological: Negative for dizziness, weakness, light-headedness and numbness. Hematological: Does not bruise/bleed easily. Psychiatric/Behavioral: Negative for behavioral problems, confusion and self-injury. The patient is not nervous/anxious. Objective: Clinical evaluation of the patient reveals hyperkeratotic tissue formation to the right foot submetatarsal head three. There is pain with direct palpation of this lesion. Debridement of this lesion with a fifteen blade reveals a central core. This core was also debrided with a fifteen blade. There are no signs of bacterial infection noted to this area. Assessment:    Diagnosis Orders   1. Corns and callosities  [place] TRIM HYPERKERATOTIC SKIN LESION, ONE   2. Pain in right foot  [place] TRIM HYPERKERATOTIC SKIN LESION, ONE         Plan: 1. Clinical evaluation of the patient.  2. The lesion(s) to the right foot debrided with a 15 blade without event.  3. Return in about 4 weeks (around 10/29/2020) for Painful callous(es).   10/1/2020      Yaima Friedman DPM

## 2020-10-15 ENCOUNTER — OFFICE VISIT (OUTPATIENT)
Dept: PODIATRY | Age: 30
End: 2020-10-15
Payer: COMMERCIAL

## 2020-10-15 VITALS — HEIGHT: 73 IN | WEIGHT: 190 LBS | BODY MASS INDEX: 25.18 KG/M2

## 2020-10-15 PROCEDURE — 99999 PR OFFICE/OUTPT VISIT,PROCEDURE ONLY: CPT | Performed by: PODIATRIST

## 2020-10-15 PROCEDURE — 17110 DESTRUCTION B9 LES UP TO 14: CPT | Performed by: PODIATRIST

## 2020-10-15 ASSESSMENT — ENCOUNTER SYMPTOMS
BACK PAIN: 0
COLOR CHANGE: 0
SHORTNESS OF BREATH: 0
NAUSEA: 0
DIARRHEA: 0

## 2020-10-15 NOTE — PROGRESS NOTES
[address] [place] [address] Podiatry  Return Patient Progress Note    Subjective: Rebecca Mar 31 y.o. male that presents for follow up evaluation of painful callous to the right foot. Chief Complaint   Patient presents with   Patrick Rosales     right foot 2 weeks f/u     Patient's treatment thus far has included serial debridement. Pain is rated 4 out of 10 and is described as intermittent. Patient has been following my prescribed course of therapy as instructed. Review of Systems   Constitutional: Negative for activity change, appetite change, chills, diaphoresis, fatigue and fever. Respiratory: Negative for shortness of breath. Cardiovascular: Negative for leg swelling. Gastrointestinal: Negative for diarrhea and nausea. Endocrine: Negative for cold intolerance, heat intolerance and polyuria. Musculoskeletal: Negative for arthralgias, back pain, gait problem, joint swelling and myalgias. Skin: Negative for color change, pallor, rash and wound. Allergic/Immunologic: Negative for environmental allergies and food allergies. Neurological: Negative for dizziness, weakness, light-headedness and numbness. Hematological: Does not bruise/bleed easily. Psychiatric/Behavioral: Negative for behavioral problems, confusion and self-injury. The patient is not nervous/anxious. Objective: Clinical evaluation of the patient reveals hyperkeratotic tissue formation to the right foot submetatarsal head three. There is pain with direct palpation of this lesion. Debridement of this lesion with a fifteen blade reveals a central core. This core was also debrided with a fifteen blade. There are no signs of bacterial infection noted to this area. Assessment:    Diagnosis Orders   1. Punctate porokeratosis  [place] DESTRUCTION BENIGN LESIONS UP TO 14   2. Pain in right foot  [place] DESTRUCTION BENIGN LESIONS UP TO 14         Plan: 1. Clinical evaluation of the patient.  2. Following debridement of benign skin lesions the areas were treated with Phenol and covered with Band-Aids.  3. Return in about 2 weeks (around 10/29/2020) for Painful callous(es).   10/15/2020      Nikko Fernandes DPM

## 2020-10-30 ENCOUNTER — OFFICE VISIT (OUTPATIENT)
Dept: NEUROLOGY | Age: 30
End: 2020-10-30
Payer: COMMERCIAL

## 2020-10-30 PROCEDURE — 95911 NRV CNDJ TEST 9-10 STUDIES: CPT | Performed by: PSYCHIATRY & NEUROLOGY

## 2020-10-30 PROCEDURE — 95886 MUSC TEST DONE W/N TEST COMP: CPT | Performed by: PSYCHIATRY & NEUROLOGY

## 2020-10-30 NOTE — PROGRESS NOTES
[address], [address] [place] [address]         Patient: Deepali Lyn Address: [address] [facility]   Sex: Male Address: [address]  Age: Cher 236: ALLEN  Height: 71 inches State: [place]   Weight: 190 lbs ZIP: [id number] [place] [address].#: [id number] Phone: [phone]  Ref. M.ROSA MARIA.: Kerri Terrazas [place] Physician: SHAN Pacheco Test Date: 10/30/20      History/Comments: This is a 79-year-old male with >1 yr hx of numbness and tingling in both lower extremities along with back pain radiating down right lower extremity intermittently. He admits to having painful sensations with pins-and-needles in lower legs and feet. He also had associated gait difficulties. He has been using inserts in his shoes to help for walking. On exam: Good strength of 5/5 power in both upper and lower extremities; sensory exam significant for impaired pinprick and temperature in distal lower extremities; hypoactive DTRs in both lower extremities and wide-based gait. Temperature is measured and it is 94 °F.    Motor Nerve Study    Peroneal Nerve  Rec Site:   EDB Lat (ms) Norm Lat Amp (mV) Norm Amp Dist (mm) C.V. (m/s) Norm C.V.  STIM SITE L R   L R   L R L R    Ankle 4.8 5.0 <6.5  7.3 5.8 >2  90 90      Fib. Head 13.0 12.4   6.8 5.4   356 352 43.6 47.5 >40       Tibial Nerve  Rec Site:   AH Lat (ms) Norm Lat Amp (mV) Norm Amp Dist (mm) C.V. (m/s) Norm C.V.  STIM SITE L R   L R   L R L R    Ankle 5.3 5.0 <5.8  3.9 2.0 >4  90 90      Pop. Fos. 14.2 14.0   2.6 1.9   364 384 41.2 42.7 >40       Sensory Nerve Study    Sural Nerve  Rec Site:   Ankle Norm Lat Pk Lat (ms)Amp (uV) Norm Amp Dist (mm) C.V. (m/s) Norm C.V.  STIM SITE   L R L R   L R L R    mid calf <4.4  4.3 4.2 20.6 12.0 >6  140 140 42.0 40.4 >40       S.  Peroneal  Nerve  Rec Site:  lateral Ankl Norm Lat Pk Lat (ms)Amp (uV) Norm Amp Dist (mm) C.V. (m/s) Norm C.V.  STIM SITE   L R L R   L R L R    lateral calf <4.4  3.3 3.3 21.3 27.0 >6  120 120 44.4 45.6 >40       H Reflex comparison with normal distal latency and conduction velocity. 5. Left tibial motor potential demonstrated normal distal latency, amplitude and conduction velocity. 6. Bilateral tibial H-reflex latencies are within normal limits. 7. Needle recording using monopolar needle was performed in a sampling of L2-L3 to L5-S1 innervated limb and paraspinal muscles in bilateral lower extremities. 8. Needle study of left lower extremity demonstrated normal-appearing motor units without any abnormal spontaneous activity in the muscles tested as stated above. 9. Needle study of right lower extremity demonstrated increased insertional activity with small fibrillation potentials with the longer, larger motor units with decreased recruitment in right abductor hallucis, ADQ muscles. Right lower lumbar paraspinal muscles demonstrated increased insertional activity with fibrillations and few positive sharp wave potentials. Rest of the muscles demonstrated normal-appearing motor units without any abnormal spontaneous activity in the muscles tested as stated above. Electrodiagnostic interpretation:   There is electrophysiologic evidence of right lower lumbosacral radiculopathy of subacute nature.  There is no electrophysiologic evidence of lumbosacral radiculopathy in the left lower extremity.  This study did not demonstrate any electrodiagnostic evidence of peripheral polyneuropathy or lumbosacral plexopathy in either lower extremity. Clinical correlation is recommended. Germain Paiz MD 10/30/2020 1:53 PM                   Please feel free to contact me should you have any questions or concerns regarding results of any of the above studies. Again, thank you for referring this patient and for allowing me to participate in the care of your patient. With sincere appreciation,      Charlie Grubbs M.D.   Dictated, but not edited  Board Certified Neurologist      LEGEND :-  *EMG: electromyography *NCS: nerve conduction study*EHL : extensor hallucis longus   *Med. Gastroc : medial head of the gastrocnemius*Abd.  Hsu. : abductor hallucis brevis*ADQ : abductor digiti quinti*Tib. ant. : tibialis anterior  *Tib. post. : tibialis posterior*abn : abnormal   *fibs : fibrillations  *FDI: first  dorsal interosseous*ADM: abductor digiti minimi*APB: abductor pollicis brevis *FCU: flexor corpi ulnaris*EIP: extensor indicis proprius*EDC: extensor digitorum communis *ECR: extensor carpi radialis  *PSW : positive sharp waves  *Fascics : fasciculations  *MUAP : motor unit action potential*CTS : carpal tunnel syndrome *SNAP : sensory nerve action potential  *CMAP : compound muscle action potential

## 2020-11-09 ENCOUNTER — OFFICE VISIT (OUTPATIENT)
Dept: PODIATRY | Age: 30
End: 2020-11-09
Payer: COMMERCIAL

## 2020-11-09 VITALS — BODY MASS INDEX: 24.52 KG/M2 | WEIGHT: 185 LBS | HEIGHT: 73 IN

## 2020-11-09 PROCEDURE — 11055 PARING/CUTG B9 HYPRKER LES 1: CPT | Performed by: PODIATRIST

## 2020-11-09 PROCEDURE — 99999 PR OFFICE/OUTPT VISIT,PROCEDURE ONLY: CPT | Performed by: PODIATRIST

## 2020-11-09 ASSESSMENT — ENCOUNTER SYMPTOMS
BACK PAIN: 0
DIARRHEA: 0
SHORTNESS OF BREATH: 0
COLOR CHANGE: 0
NAUSEA: 0

## 2020-11-09 NOTE — PROGRESS NOTES
healthy, pink skin without event. 3. Return if symptoms worsen or fail to improve.    11/9/2020      Reed Ramirez DPM

## 2021-06-12 NOTE — PROGRESS NOTES
NEUROLOGY FOLLOW-UP VISIT   Patient Name:       Karlynn Hammans  :        1990  Clinic Visit Date:    2021  Last Visit: 10/30/20      Dear Dr. Marina Chambers PA-C     I saw Mr. Karlynn Hammans in follow-up in the office today in continuation of neurologic care. As you know he  is a 27 y.o. left handed male initially seen in neurology consultation on 2020 with six month hx of numbness and tingling in both lower extremities. Today is his follow up visit after EMG testing. He comes to clinic stating that his back pain extending to right lower extremity is worse; described as burning pain occurring in the evening and night with worsening severity; unable to sleep; denied bladder dysfunction. Also states \"abnormal sensation in bottom of right foot still happening more in evening and night\". During initial visit; he c/o abnormal sensations with pain in bottom of right foot predominantly. He has been having aggravation of symptoms towards afternoon and evening. He has numbness and tingling sensation in both feet and lower legs since summer of 2019. He has been having occasional tremors in lower extremities also. He rarely has back pain radiating down lower extremities. He also stated that he has been having gait difficulties and has been to physical therapy. He has been using inserts in his shoes to help for walking. He has been avoiding any falls. Upon further questioning he admits to drinking 4 shots of liquor per week every week since age 23 for the past 10 years. He also admits to having some memory problems but he is completely independent of all ADLs. He has been driving with no problems. He also admits to having mood disorder and he is presently not on any medications. Denies anxiety and depression and suicidal ideations.     Review of systems done by staff reviewed and pertinent positives include trouble walking at times with back pain, numbness and tingling sensation in bilateral lower extremities with painful sensations in both feet with occasional balance difficulties. Current Outpatient Medications on File Prior to Visit   Medication Sig Dispense Refill    gabapentin (NEURONTIN) 100 MG capsule Take one cap twice daily 60 capsule 1    ibuprofen (IBU) 800 MG tablet Take 1 tablet by mouth every 6 hours as needed for Pain 21 tablet 0     No current facility-administered medications on file prior to visit. Allergies: Benjamin Nathan has No Known Allergies. Past Medical History:   Diagnosis Date    Memory disorder     short term    Movement disorder     twitching    Neuropathy     Seizures ([address]     once       Past Surgical History:   Procedure Laterality Date    TOE SURGERY Right 2019    tendon release, tend to last toe     Social History: Benjamin Nathan  reports that he quit smoking about 2 years ago. His smoking use included cigarettes. He has a 5.00 pack-year smoking history. He has never used smokeless tobacco. He reports current alcohol use of about 4.0 standard drinks of alcohol per week. He reports previous drug use. Family History   Problem Relation Age of Onset    Seizures Mother     Mult Sclerosis Sister     Diabetes Maternal Grandmother        On exam: vitals: Blood pressure 118/78, pulse 70, temperature 97.3 °F (36.3 °C), temperature source Temporal, height 6' 1\" (1.854 m), weight 189 lb (85.7 kg). NEUROLOGIC EXAMINATION  GENERAL  Appears comfortable and in no distress   HEENT  NC/ AT   NECK  Supple and no bruits heard   MENTAL STATUS:  Alert, oriented, intact memory, no confusion, normal speech, normal language, no hallucination or delusion; appropriate affect   CRANIAL NERVES: II     -      PERRLA, Fundi reveal intact venous pulsations;  Visual fields intact to confrontation  III,IV,VI -  EOMs full, no afferent defect, no  KIAH, no ptosis  V     -     Normal facial sensation  VII    -     Normal facial symmetry  VIII   -     Intact hearing  IX,X - Symmetrical palate  XI    -     Symmetrical shoulder shrug  XII   -     Midline tongue, no atrophy    MOTOR FUNCTION:  significant for good strength of grade 5/5 in bilateral proximal and distal muscle groups of both upper and lower extremities with normal bulk, normal tone and no involuntary movements, no tremor   SENSORY FUNCTION:  Impaired pinprick, temperature and vibration in both lower extremities distally with mild stocking pattern. CEREBELLAR FUNCTION:  Intact fine motor control over upper limbs   REFLEX FUNCTION:  Symmetric 1+ DTRs in both upper and lower extremities with bilateral flexor plantar response. STATION and GAIT  Normal station, wide-based gait and has difficulty with tandem gait. Diagnostic data reviewed with the patient:   MRI lumbar spine (w/wo) 1/11/2020: No significant lumbar disc protrusion, spinal canal or neuroforaminal narrowing. No acute abnormality in the lumbar spine. No areas of abnormal enhancement. ESR (1/23/2020): 5  B12 level (1/23/2020): 518  Folate level (1/23/2020): >20  TSH (1/23/2020): 0.97  Hemoglobin A1c (1/23/2020): 5.0    Lab Results   Component Value Date    SEDRATE 5 01/23/2020     Lab Results   Component Value Date    [id number] 635 01/23/2020      Lab Results   Component Value Date    FOLATE >20.0 01/23/2020     No results found for: CINDY  Lab Results   Component Value Date    TSH 0.97 01/23/2020         Lab Results   Component Value Date    LABA1C 5.0 01/23/2020       EMG of Lower Extre (10/30/20): There is electrophysiologic evidence of right lower lumbosacral radiculopathy of subacute nature. There is no electrophysiologic evidence of lumbosacral radiculopathy in the left lower extremity. This study did not demonstrate any electrodiagnostic evidence of peripheral polyneuropathy or lumbosacral plexopathy in either lower extremity.             Impression and Plan: Mr. Denisse Romero is a 27 y.o. male with   · Radicular pain, lumbar and right lower extremity pain, with abnl emg  · Sensory neuropathy symptomatology in both feet since fall of 2019; -ve EMG and serum studies  · Congenital Pes Planus    Worsening back pain radiating down right lower extremity; affecting daily routine; no help with physical therapy; could not tolerate gabapentin.,  Paresthesia and dysesthesias in bilateral lower extremities \"since summer of 2019\". , EMG didn't show evidence of peripheral neuropathy but right lower lumbosacral radiculopathy.,  Will get MRI of lumbar spine and refer him to pain clinic for possible epidural injection therapy. During the interim will start him on amitriptyline half tablet of 25 mg at the time of sleep for 10 days and then full tablet at the time of sleep. Also to take muscle relaxer as needed for muscle spasms. Also discussed about diclofenac gel/Voltaren gel to be applied topically twice daily as needed. Neurologic exam significant for mild sensory impairment to temp, pin prick and vibration in distal lower extremities  Hx of alcoholism  B12, folate level, TSH are unremarkable. Has had PT with some improvement in gait; he was advised to continue those instructions. Pes planus, congenital bilateral feet: On custom-made orthotics; continue follow-up visits with podiatrist.  Follow up in clinic in 2-3 months. Please note that portions of this note were completed with a voice recognition program.  Although every effort was made to ensure the accuracy of this automated transcription, some errors in transcription may have occurred; occasionally words are mis-transcribed. Thank you for understanding.

## 2021-06-14 ENCOUNTER — OFFICE VISIT (OUTPATIENT)
Dept: NEUROLOGY | Age: 31
End: 2021-06-14
Payer: COMMERCIAL

## 2021-06-14 VITALS
TEMPERATURE: 97.3 F | HEIGHT: 73 IN | HEART RATE: 70 BPM | DIASTOLIC BLOOD PRESSURE: 78 MMHG | BODY MASS INDEX: 25.05 KG/M2 | WEIGHT: 189 LBS | SYSTOLIC BLOOD PRESSURE: 118 MMHG

## 2021-06-14 DIAGNOSIS — M54.41 CHRONIC RIGHT-SIDED LOW BACK PAIN WITH RIGHT-SIDED SCIATICA: ICD-10-CM

## 2021-06-14 DIAGNOSIS — R20.0 NUMBNESS AND TINGLING OF BOTH LEGS BELOW KNEES: ICD-10-CM

## 2021-06-14 DIAGNOSIS — R20.2 NUMBNESS AND TINGLING OF BOTH LEGS BELOW KNEES: ICD-10-CM

## 2021-06-14 DIAGNOSIS — M79.671 PAIN IN BOTH FEET: ICD-10-CM

## 2021-06-14 DIAGNOSIS — M79.672 PAIN IN BOTH FEET: ICD-10-CM

## 2021-06-14 DIAGNOSIS — M54.16 RIGHT LUMBAR RADICULOPATHY: Primary | ICD-10-CM

## 2021-06-14 DIAGNOSIS — M62.838 MUSCLE SPASM: ICD-10-CM

## 2021-06-14 DIAGNOSIS — G89.29 CHRONIC RIGHT-SIDED LOW BACK PAIN WITH RIGHT-SIDED SCIATICA: ICD-10-CM

## 2021-06-14 PROCEDURE — G8427 DOCREV CUR MEDS BY ELIG CLIN: HCPCS | Performed by: PSYCHIATRY & NEUROLOGY

## 2021-06-14 PROCEDURE — 1036F TOBACCO NON-USER: CPT | Performed by: PSYCHIATRY & NEUROLOGY

## 2021-06-14 PROCEDURE — G8420 CALC BMI NORM PARAMETERS: HCPCS | Performed by: PSYCHIATRY & NEUROLOGY

## 2021-06-14 PROCEDURE — 99214 OFFICE O/P EST MOD 30 MIN: CPT | Performed by: PSYCHIATRY & NEUROLOGY

## 2021-06-14 RX ORDER — TIZANIDINE 2 MG/1
2 TABLET ORAL NIGHTLY PRN
Qty: 20 TABLET | Refills: 0 | Status: SHIPPED | OUTPATIENT
Start: 2021-06-14

## 2021-06-14 RX ORDER — AMITRIPTYLINE HYDROCHLORIDE 25 MG/1
TABLET, FILM COATED ORAL
Qty: 30 TABLET | Refills: 1 | Status: SHIPPED | OUTPATIENT
Start: 2021-06-14

## 2021-06-14 NOTE — PROGRESS NOTES
HEENT [] Hearing Loss  [] Visual Disturbance  [] Tinnitus  [] Eye pain   Respiratory [] Shortness of Breath  [] Cough  [] Snoring   Cardiovascular [] Chest Pain  [] Palpitations  [] Lightheaded   GI [] Constipation  [] Diarrhea  [] Swallowing change  [] Nausea/vomiting    [] Urinary Frequency  [] Urinary Urgency   Musculoskeletal [] Neck pain  [x] Back pain  [] Muscle pain  [] Restless legs   Dermatologic [] Skin changes   Neurologic [] Memory loss/confusion  [] Seizures  [x] Trouble walking or imbalance  [] Dizziness  [] Sleep disturbance  [x] Weakness  [x] Numbness  [] Tremors  [] Speech Difficulty  [] Headaches  [] Light Sensitivity  [] Sound Sensitivity   Endocrinology []Excessive thirst  []Excessive hunger   Psychiatric [] Anxiety/Depression  [] Hallucination   Allergy/immunology []Hives/environmental allergies   Hematologic/lymph [] Abnormal bleeding  [] Abnormal bruising

## 2021-07-16 ENCOUNTER — HOSPITAL ENCOUNTER (OUTPATIENT)
Dept: MRI IMAGING | Age: 31
Discharge: HOME | End: 2021-07-18
Payer: COMMERCIAL

## 2021-07-16 DIAGNOSIS — G89.29 CHRONIC RIGHT-SIDED LOW BACK PAIN WITH RIGHT-SIDED SCIATICA: ICD-10-CM

## 2021-07-16 DIAGNOSIS — M54.16 RIGHT LUMBAR RADICULOPATHY: ICD-10-CM

## 2021-07-16 DIAGNOSIS — M54.41 CHRONIC RIGHT-SIDED LOW BACK PAIN WITH RIGHT-SIDED SCIATICA: ICD-10-CM

## 2021-07-16 DIAGNOSIS — R20.0 NUMBNESS AND TINGLING OF BOTH LEGS BELOW KNEES: ICD-10-CM

## 2021-07-16 DIAGNOSIS — R20.2 NUMBNESS AND TINGLING OF BOTH LEGS BELOW KNEES: ICD-10-CM

## 2021-07-16 PROCEDURE — 72148 MRI LUMBAR SPINE W/O DYE: CPT
